# Patient Record
Sex: MALE | Race: WHITE | NOT HISPANIC OR LATINO
[De-identification: names, ages, dates, MRNs, and addresses within clinical notes are randomized per-mention and may not be internally consistent; named-entity substitution may affect disease eponyms.]

---

## 2020-08-04 ENCOUNTER — LABORATORY RESULT (OUTPATIENT)
Age: 85
End: 2020-08-04

## 2020-08-04 ENCOUNTER — APPOINTMENT (OUTPATIENT)
Dept: HEMATOLOGY ONCOLOGY | Facility: CLINIC | Age: 85
End: 2020-08-04
Payer: MEDICARE

## 2020-08-04 VITALS
BODY MASS INDEX: 26.36 KG/M2 | HEART RATE: 71 BPM | WEIGHT: 180 LBS | RESPIRATION RATE: 16 BRPM | TEMPERATURE: 96.6 F | HEIGHT: 69.29 IN | DIASTOLIC BLOOD PRESSURE: 62 MMHG | SYSTOLIC BLOOD PRESSURE: 122 MMHG

## 2020-08-04 DIAGNOSIS — Z78.9 OTHER SPECIFIED HEALTH STATUS: ICD-10-CM

## 2020-08-04 DIAGNOSIS — Z83.3 FAMILY HISTORY OF DIABETES MELLITUS: ICD-10-CM

## 2020-08-04 DIAGNOSIS — Z86.39 PERSONAL HISTORY OF OTHER ENDOCRINE, NUTRITIONAL AND METABOLIC DISEASE: ICD-10-CM

## 2020-08-04 PROCEDURE — 99205 OFFICE O/P NEW HI 60 MIN: CPT

## 2020-08-04 RX ORDER — ACETAMINOPHEN/DIPHENHYDRAMINE 500MG-25MG
1000 TABLET ORAL
Qty: 30 | Refills: 5 | Status: ACTIVE | COMMUNITY
Start: 2020-08-04 | End: 1900-01-01

## 2020-08-04 RX ORDER — FOLIC ACID 1 MG/1
1 TABLET ORAL
Qty: 30 | Refills: 5 | Status: ACTIVE | COMMUNITY
Start: 2020-08-04 | End: 1900-01-01

## 2020-08-04 NOTE — ASSESSMENT
[FreeTextEntry1] : 83 yo M with PMHx of IDDM presents here today for establishment of care. He was referred here for Anemia. Based on the previous blood work, patient has worsening Iron Deficiency Anemia and suspect that this is secondary likely to GI Bleed; noted that in 2019, patient's colonoscopy was "poorly prepped."  patient noted to have low Vitamin B12 on previous blood work. \par \par Recommend GI eval asap\par IRON replacement\par PRBC transfusion as he is symptomatic from anemia (fatigue)\par  CT chest abdomen and pelvis with IV contrast ordered as well as previous CT scan of chest (only with oral contrast) showed some mediastinal lymphadenopathy. \par \par  will check CBC, CMP, Iron studies, MMA and Homocysteine (given his low B12 level), Retic Count, LDH. Patient and daughter instructed to follow up with GI (referral given) as patient will need a repeat colonoscopy. Given his reported severe fatigue, he will receive 2 units of PRBC and will start on Venofer 200mg x 5 doses. \par \par

## 2020-08-04 NOTE — REVIEW OF SYSTEMS
[Incontinence] : incontinence [Negative] : Gastrointestinal [Fever] : no fever [FreeTextEntry8] : Pt has known enlarged prostate  [Chills] : no chills [Night Sweats] : no night sweats

## 2020-08-04 NOTE — HISTORY OF PRESENT ILLNESS
[de-identified] : Patient is a 85 yo M with PMHx of DM presents here today for establishment of care. He was referred here for Anemia. Blood work reviewed and shows that patient had a H/H of 13/41.3 in September 2019. He had a repeat in November 2019 which was 9.2/28 with MCV 84.2. Iron studies showed Iron <10, Ferritin 10.3 with 2% saturation. Patient had a colonoscopy and EGD done in November of 2019. Patient was poorly prepped for Colonoscopy and had one polyp removed. EGD was unremarkable. Since then, patient has had persistently decreasing hemoglobin levels with last hemoglobin level of 7.2 with MCV 64 in July 2020. He reports extreme fatigue but otherwise denies any shortness of breath, chest pain, palpitations, melena, or hematochezia. \par \par Other blood work revealed NL to elevated Folic Acid, Vitamin B12 of 152, Homocysteine 14

## 2020-08-05 LAB
ABO + RH PNL BLD: NORMAL
ALBUMIN SERPL ELPH-MCNC: 4.5 G/DL
ALP BLD-CCNC: 52 U/L
ALT SERPL-CCNC: 10 U/L
ANION GAP SERPL CALC-SCNC: 15 MMOL/L
AST SERPL-CCNC: 14 U/L
BILIRUB DIRECT SERPL-MCNC: <0.2 MG/DL
BILIRUB INDIRECT SERPL-MCNC: >0.2 MG/DL
BILIRUB SERPL-MCNC: 0.4 MG/DL
BLD GP AB SCN SERPL QL: NORMAL
BUN SERPL-MCNC: 40 MG/DL
CALCIUM SERPL-MCNC: 9.2 MG/DL
CHLORIDE SERPL-SCNC: 98 MMOL/L
CO2 SERPL-SCNC: 26 MMOL/L
CREAT SERPL-MCNC: 1.3 MG/DL
FERRITIN SERPL-MCNC: 10 NG/ML
FOLATE SERPL-MCNC: 14.3 NG/ML
GLUCOSE SERPL-MCNC: 185 MG/DL
HCT VFR BLD CALC: 29.9 %
HCYS SERPL-MCNC: 20 UMOL/L
HGB BLD-MCNC: 8.1 G/DL
IRON SATN MFR SERPL: 28 %
IRON SERPL-MCNC: 95 UG/DL
LDH SERPL-CCNC: 155 U/L
MAGNESIUM SERPL-MCNC: 2.6 MG/DL
MCHC RBC-ENTMCNC: 18.1 PG
MCHC RBC-ENTMCNC: 27.1 G/DL
MCV RBC AUTO: 66.9 FL
PLATELET # BLD AUTO: 245 K/UL
PMV BLD: 10 FL
POTASSIUM SERPL-SCNC: 4.9 MMOL/L
PROT SERPL-MCNC: 6.8 G/DL
RBC # BLD: 4.47 M/UL
RBC # FLD: 22.6 %
RETICS # AUTO: 1.7 %
RETICS AGGREG/RBC NFR: 74.2 K/UL
SODIUM SERPL-SCNC: 139 MMOL/L
TIBC SERPL-MCNC: 336 UG/DL
UIBC SERPL-MCNC: 241 UG/DL
URATE SERPL-MCNC: 7.5 MG/DL
VIT B12 SERPL-MCNC: 409 PG/ML
WBC # FLD AUTO: 7.12 K/UL

## 2020-08-07 LAB — METHYLMALONATE SERPL-SCNC: 133 NMOL/L

## 2020-08-12 ENCOUNTER — OUTPATIENT (OUTPATIENT)
Dept: OUTPATIENT SERVICES | Facility: HOSPITAL | Age: 85
LOS: 1 days | Discharge: HOME | End: 2020-08-12

## 2020-08-12 ENCOUNTER — OUTPATIENT (OUTPATIENT)
Dept: OUTPATIENT SERVICES | Facility: HOSPITAL | Age: 85
LOS: 1 days | Discharge: HOME | End: 2020-08-12
Payer: MEDICARE

## 2020-08-12 ENCOUNTER — LABORATORY RESULT (OUTPATIENT)
Age: 85
End: 2020-08-12

## 2020-08-12 ENCOUNTER — RESULT REVIEW (OUTPATIENT)
Age: 85
End: 2020-08-12

## 2020-08-12 DIAGNOSIS — D50.9 IRON DEFICIENCY ANEMIA, UNSPECIFIED: ICD-10-CM

## 2020-08-12 DIAGNOSIS — Z11.59 ENCOUNTER FOR SCREENING FOR OTHER VIRAL DISEASES: ICD-10-CM

## 2020-08-12 PROCEDURE — 71260 CT THORAX DX C+: CPT | Mod: 26

## 2020-08-12 PROCEDURE — 74177 CT ABD & PELVIS W/CONTRAST: CPT | Mod: 26

## 2020-08-14 ENCOUNTER — APPOINTMENT (OUTPATIENT)
Dept: INFUSION THERAPY | Facility: CLINIC | Age: 85
End: 2020-08-14

## 2020-08-14 DIAGNOSIS — Z02.9 ENCOUNTER FOR ADMINISTRATIVE EXAMINATIONS, UNSPECIFIED: ICD-10-CM

## 2020-08-14 DIAGNOSIS — Z11.59 ENCOUNTER FOR SCREENING FOR OTHER VIRAL DISEASES: ICD-10-CM

## 2020-08-14 RX ORDER — IRON SUCROSE 20 MG/ML
200 INJECTION, SOLUTION INTRAVENOUS ONCE
Refills: 0 | Status: COMPLETED | OUTPATIENT
Start: 2020-08-14 | End: 2020-08-14

## 2020-08-14 RX ADMIN — IRON SUCROSE 520 MILLIGRAM(S): 20 INJECTION, SOLUTION INTRAVENOUS at 10:12

## 2020-08-14 RX ADMIN — IRON SUCROSE 200 MILLIGRAM(S): 20 INJECTION, SOLUTION INTRAVENOUS at 10:42

## 2020-08-17 ENCOUNTER — APPOINTMENT (OUTPATIENT)
Dept: INFUSION THERAPY | Facility: CLINIC | Age: 85
End: 2020-08-17

## 2020-08-17 RX ORDER — IRON SUCROSE 20 MG/ML
200 INJECTION, SOLUTION INTRAVENOUS ONCE
Refills: 0 | Status: COMPLETED | OUTPATIENT
Start: 2020-08-17 | End: 2020-08-17

## 2020-08-17 RX ADMIN — IRON SUCROSE 220 MILLIGRAM(S): 20 INJECTION, SOLUTION INTRAVENOUS at 10:58

## 2020-08-17 RX ADMIN — IRON SUCROSE 200 MILLIGRAM(S): 20 INJECTION, SOLUTION INTRAVENOUS at 11:30

## 2020-08-21 ENCOUNTER — APPOINTMENT (OUTPATIENT)
Dept: INFUSION THERAPY | Facility: CLINIC | Age: 85
End: 2020-08-21

## 2020-08-21 RX ORDER — IRON SUCROSE 20 MG/ML
200 INJECTION, SOLUTION INTRAVENOUS ONCE
Refills: 0 | Status: COMPLETED | OUTPATIENT
Start: 2020-08-21 | End: 2020-08-21

## 2020-08-21 RX ADMIN — IRON SUCROSE 200 MILLIGRAM(S): 20 INJECTION, SOLUTION INTRAVENOUS at 11:06

## 2020-08-21 RX ADMIN — IRON SUCROSE 220 MILLIGRAM(S): 20 INJECTION, SOLUTION INTRAVENOUS at 10:36

## 2020-08-24 ENCOUNTER — APPOINTMENT (OUTPATIENT)
Dept: INFUSION THERAPY | Facility: CLINIC | Age: 85
End: 2020-08-24

## 2020-08-24 RX ORDER — IRON SUCROSE 20 MG/ML
200 INJECTION, SOLUTION INTRAVENOUS ONCE
Refills: 0 | Status: COMPLETED | OUTPATIENT
Start: 2020-08-24 | End: 2020-08-24

## 2020-08-24 RX ADMIN — IRON SUCROSE 200 MILLIGRAM(S): 20 INJECTION, SOLUTION INTRAVENOUS at 11:50

## 2020-08-24 RX ADMIN — IRON SUCROSE 220 MILLIGRAM(S): 20 INJECTION, SOLUTION INTRAVENOUS at 11:20

## 2020-08-25 ENCOUNTER — APPOINTMENT (OUTPATIENT)
Dept: HEMATOLOGY ONCOLOGY | Facility: CLINIC | Age: 85
End: 2020-08-25

## 2020-08-28 ENCOUNTER — LABORATORY RESULT (OUTPATIENT)
Age: 85
End: 2020-08-28

## 2020-08-28 ENCOUNTER — APPOINTMENT (OUTPATIENT)
Dept: HEMATOLOGY ONCOLOGY | Facility: CLINIC | Age: 85
End: 2020-08-28
Payer: MEDICARE

## 2020-08-28 ENCOUNTER — APPOINTMENT (OUTPATIENT)
Dept: INFUSION THERAPY | Facility: CLINIC | Age: 85
End: 2020-08-28
Payer: MEDICARE

## 2020-08-28 VITALS
DIASTOLIC BLOOD PRESSURE: 56 MMHG | TEMPERATURE: 97.2 F | HEIGHT: 69 IN | BODY MASS INDEX: 27.25 KG/M2 | SYSTOLIC BLOOD PRESSURE: 122 MMHG | RESPIRATION RATE: 16 BRPM | HEART RATE: 84 BPM | WEIGHT: 184 LBS

## 2020-08-28 PROCEDURE — 99215 OFFICE O/P EST HI 40 MIN: CPT

## 2020-08-28 RX ORDER — IRON SUCROSE 20 MG/ML
200 INJECTION, SOLUTION INTRAVENOUS ONCE
Refills: 0 | Status: COMPLETED | OUTPATIENT
Start: 2020-08-28 | End: 2020-08-28

## 2020-08-28 RX ADMIN — IRON SUCROSE 200 MILLIGRAM(S): 20 INJECTION, SOLUTION INTRAVENOUS at 11:33

## 2020-08-28 RX ADMIN — IRON SUCROSE 220 MILLIGRAM(S): 20 INJECTION, SOLUTION INTRAVENOUS at 11:13

## 2020-08-28 NOTE — REVIEW OF SYSTEMS
[Incontinence] : incontinence [Negative] : Musculoskeletal [Fever] : no fever [Night Sweats] : no night sweats [Chills] : no chills [FreeTextEntry8] : Pt has known enlarged prostate

## 2020-08-28 NOTE — CONSULT LETTER
[Dear  ___] : Dear  [unfilled], [Please see my note below.] : Please see my note below. [Consult Closing:] : Thank you very much for allowing me to participate in the care of this patient.  If you have any questions, please do not hesitate to contact me. [Consult Letter:] : I had the pleasure of evaluating your patient, [unfilled]. [Sincerely,] : Sincerely, [DrStella  ___] : Dr. BONILLA [FreeTextEntry3] : Dr. Botello

## 2020-08-28 NOTE — ASSESSMENT
[FreeTextEntry1] : 85 yo M with PMHx of IDDM presents here today for establishment of care. He was referred here for Anemia. Based on the previous blood work, patient has worsening Iron Deficiency Anemia and suspect that this is secondary likely to GI Bleed; noted that in 2019, patient's colonoscopy was "poorly prepped."  patient noted to have low Vitamin B12 on previous blood work. \par - s/p capsule endoscopy as per pt\par - followup with GI, Dr. Tamez, as indicated; needs a repeat colonoscopy. \par - to complete Venofer 200mg x 5 doses. \par \par #Prostatomegaly with thick-walled urinary bladder noted on CT from 08/2020\par - Urologic follow-up recommended, Dr. Margarito Pemberton to r/o bladder neoplasm, and correlation with direct visualization as warranted\par \par # 3.2 x 3.2 cm hypoattenuating pancreatic tail lesion; cystic lesions in the pancreatic head on CT from 08/2020\par - Recommend evaluation with pancreatic protocol MRI, ordered\par \par # RUL spiculated nodule, suspicious.  Noted on 08/2020\par - Short-term follow-up in 3 months, PET/CT or tissue sampling is recommended (~11/2020): will defer now until he resolves pancreas issue and HOMERO issue and bladder issue\par \par RTC in 3 weeks, post MRI abdomen (to r/o pancreas neoplasm

## 2020-08-28 NOTE — HISTORY OF PRESENT ILLNESS
[de-identified] : Patient is a 83 yo M with PMHx of DM presents here today for establishment of care. He was referred here for Anemia. Blood work reviewed and shows that patient had a H/H of 13/41.3 in September 2019. He had a repeat in November 2019 which was 9.2/28 with MCV 84.2. Iron studies showed Iron <10, Ferritin 10.3 with 2% saturation. Patient had a colonoscopy and EGD done in November of 2019. Patient was poorly prepped for Colonoscopy and had one polyp removed. EGD was unremarkable. Since then, patient has had persistently decreasing hemoglobin levels with last hemoglobin level of 7.2 with MCV 64 in July 2020. He reports extreme fatigue but otherwise denies any shortness of breath, chest pain, palpitations, melena, or hematochezia. \par \par Other blood work revealed NL to elevated Folic Acid, Vitamin B12 of 152, Homocysteine 14   [de-identified] : 8/28/2020\par Patient is here for a follow-up visit for HOMERO, accompanied by family member.  Most recent CBC shows slight improvement in microcytic anemia, hgb now at 9.2g/dL.  Patient denies fever, chills, nausea, vomiting, pica or bleeding.  They report his appetite has improved slightly.  Spoke with patient at length regarding recent CT imaging which shows suspicious 8mm RUL nodule, pancreatic lesions and urinary bladder thickening.  However, the patient's family member states he had UroLift procedure on 8/6, just prior to recent imaging so this may be related.  Since last visit, they have also been seen by GI, Dr. Tamez, for capsule endoscopy.  \par CT C/A/P (8.28.2020) IMPRESSION:No thoracic lymphadenopathy.  8 mm speculated solid nodule in the apical right upper lobe. Short-term follow-up in 3 months, PET/CT or tissue sampling is recommended. Main pulmonary artery is borderline enlarged measuring approximately 3.2 cm which can be seen with pulmonary hypertension.  3.2 x 3.2 cm hypoattenuating pancreatic tail lesion may represent a multicystic septated lesion.  Additional hypoattenuating/cystic lesions in the pancreatic head. Recommend evaluation with pancreatic protocol MRI. Diffusely markedly trabeculated thick-walled urinary bladder. Urologic follow-up recommended and correlation with direct visualization as warranted. Prostatomegaly.  No lymphadenopathy identified in the abdomen or pelvis.

## 2020-09-01 LAB
HCT VFR BLD CALC: 31.2 %
HGB BLD-MCNC: 9.2 G/DL
MCHC RBC-ENTMCNC: 20.9 PG
MCHC RBC-ENTMCNC: 29.5 G/DL
MCV RBC AUTO: 70.7 FL
PLATELET # BLD AUTO: 129 K/UL
PMV BLD: NORMAL
RBC # BLD: 4.41 M/UL
RBC # FLD: 27.4 %
WBC # FLD AUTO: 7.92 K/UL

## 2020-09-10 ENCOUNTER — OUTPATIENT (OUTPATIENT)
Dept: OUTPATIENT SERVICES | Facility: HOSPITAL | Age: 85
LOS: 1 days | Discharge: HOME | End: 2020-09-10
Payer: MEDICARE

## 2020-09-10 ENCOUNTER — RESULT REVIEW (OUTPATIENT)
Age: 85
End: 2020-09-10

## 2020-09-10 DIAGNOSIS — D50.9 IRON DEFICIENCY ANEMIA, UNSPECIFIED: ICD-10-CM

## 2020-09-10 PROCEDURE — 74183 MRI ABD W/O CNTR FLWD CNTR: CPT | Mod: 26

## 2020-09-18 ENCOUNTER — LABORATORY RESULT (OUTPATIENT)
Age: 85
End: 2020-09-18

## 2020-09-18 ENCOUNTER — APPOINTMENT (OUTPATIENT)
Dept: HEMATOLOGY ONCOLOGY | Facility: CLINIC | Age: 85
End: 2020-09-18
Payer: MEDICARE

## 2020-09-18 VITALS
DIASTOLIC BLOOD PRESSURE: 55 MMHG | TEMPERATURE: 97.1 F | SYSTOLIC BLOOD PRESSURE: 121 MMHG | RESPIRATION RATE: 16 BRPM | HEIGHT: 69 IN | WEIGHT: 188 LBS | BODY MASS INDEX: 27.85 KG/M2 | HEART RATE: 74 BPM

## 2020-09-18 DIAGNOSIS — Z00.00 ENCOUNTER FOR GENERAL ADULT MEDICAL EXAMINATION W/OUT ABNORMAL FINDINGS: ICD-10-CM

## 2020-09-18 PROCEDURE — 99214 OFFICE O/P EST MOD 30 MIN: CPT

## 2020-09-21 LAB
HCT VFR BLD CALC: 29.8 %
HGB BLD-MCNC: 8.8 G/DL
MCHC RBC-ENTMCNC: 21.2 PG
MCHC RBC-ENTMCNC: 29.5 G/DL
MCV RBC AUTO: 71.6 FL
PLATELET # BLD AUTO: 191 K/UL
PMV BLD: 9.8 FL
RBC # BLD: 4.16 M/UL
RBC # FLD: 22.7 %
WBC # FLD AUTO: 7.81 K/UL

## 2020-09-29 NOTE — ASSESSMENT
[FreeTextEntry1] : 84 yo M with PMHx of IDDM presents here today for establishment of care. He was referred here for Anemia. Based on the previous blood work, patient has worsening Iron Deficiency Anemia and suspect that this is secondary likely to GI Bleed; noted that in 2019, patient's colonoscopy was "poorly prepped."  patient noted to have low Vitamin B12 on previous blood work. \par - s/p capsule endoscopy as per pt; requested results \par - followup with GI, Dr. Tamez, as indicated; needs a repeat colonoscopy. \par - s/p Venofer 200mg x 5 doses as of 8/28/2020 : f/u in end of October 2020\par \par #Prostatomegaly with thick-walled urinary bladder noted on CT from 08/2020\par - Urologic follow-up recommended, Dr. 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  Noted on 08/2020\par - Short-term follow-up in 3 months, PET/CT or tissue sampling is recommended (~11/2020): will defer now until he resolves pancreas issue and HOMERO issue and bladder issue: PET/CT Nov 2020 to further evaluate lung mass (and pancreas mass)\par \par

## 2020-09-29 NOTE — REVIEW OF SYSTEMS
[Incontinence] : incontinence [Negative] : Musculoskeletal [Fever] : no fever [Chills] : no chills [Night Sweats] : no night sweats [FreeTextEntry8] : Pt has known enlarged prostate

## 2020-09-29 NOTE — CONSULT LETTER
[Dear  ___] : Dear  [unfilled], [Consult Letter:] : I had the pleasure of evaluating your patient, [unfilled]. [Please see my note below.] : Please see my note below. [Consult Closing:] : Thank you very much for allowing me to participate in the care of this patient.  If you have any questions, please do not hesitate to contact me. [Sincerely,] : Sincerely, [DrStella  ___] : Dr. BONILLA [FreeTextEntry3] : Dr. Botello

## 2020-09-29 NOTE — HISTORY OF PRESENT ILLNESS
[de-identified] : Patient is a 83 yo M with PMHx of DM presents here today for establishment of care. He was referred here for Anemia. Blood work reviewed and shows that patient had a H/H of 13/41.3 in September 2019. He had a repeat in November 2019 which was 9.2/28 with MCV 84.2. Iron studies showed Iron <10, Ferritin 10.3 with 2% saturation. Patient had a colonoscopy and EGD done in November of 2019. Patient was poorly prepped for Colonoscopy and had one polyp removed. EGD was unremarkable. Since then, patient has had persistently decreasing hemoglobin levels with last hemoglobin level of 7.2 with MCV 64 in July 2020. He reports extreme fatigue but otherwise denies any shortness of breath, chest pain, palpitations, melena, or hematochezia. \par \par Other blood work revealed NL to elevated Folic Acid, Vitamin B12 of 152, Homocysteine 14   [de-identified] : 8/28/2020\par Patient is here for a follow-up visit for HOMERO, accompanied by family member.  Most recent CBC shows slight improvement in microcytic anemia, hgb now at 9.2g/dL.  Patient denies fever, chills, nausea, vomiting, pica or bleeding.  They report his appetite has improved slightly.  Spoke with patient at length regarding recent CT imaging which shows suspicious 8mm RUL nodule, pancreatic lesions and urinary bladder thickening.  However, the patient's family member states he had UroLift procedure on 8/6, just prior to recent imaging so this may be related.  Since last visit, they have also been seen by GI, Dr. Tamez, for capsule endoscopy.  \par CT C/A/P (8.28.2020) IMPRESSION:No thoracic lymphadenopathy.  8 mm speculated solid nodule in the apical right upper lobe. Short-term follow-up in 3 months, PET/CT or tissue sampling is recommended. Main pulmonary artery is borderline enlarged measuring approximately 3.2 cm which can be seen with pulmonary hypertension.  3.2 x 3.2 cm hypoattenuating pancreatic tail lesion may represent a multicystic septated lesion.  Additional hypoattenuating/cystic lesions in the pancreatic head. Recommend evaluation with pancreatic protocol MRI. Diffusely markedly trabeculated thick-walled urinary bladder. Urologic follow-up recommended and correlation with direct visualization as warranted. Prostatomegaly.  No lymphadenopathy identified in the abdomen or pelvis.\par \par 9/18/2020\par Patient is here for a follow-up visit for HOMERO, accompanied by family member.  He is s/p Venofer 200mg x 5 doses as of 8/28/2020.  Patient denies fever, chills, nausea, vomiting, pica or bleeding.  Spoke with patient regarding recent MR Abd which shows multicystic lesions in pancreatic tail and recommends biopsy.  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 No mural nodularity or solid components identified. EUS/FNA or imaging follow-up may be considered as clinically warranted. Additional scattered cystic lesions in the pancreas.

## 2020-09-30 ENCOUNTER — APPOINTMENT (OUTPATIENT)
Dept: HEMATOLOGY ONCOLOGY | Facility: CLINIC | Age: 85
End: 2020-09-30

## 2020-10-02 ENCOUNTER — APPOINTMENT (OUTPATIENT)
Dept: HEMATOLOGY ONCOLOGY | Facility: CLINIC | Age: 85
End: 2020-10-02

## 2020-10-28 ENCOUNTER — APPOINTMENT (OUTPATIENT)
Dept: HEMATOLOGY ONCOLOGY | Facility: CLINIC | Age: 85
End: 2020-10-28

## 2020-10-28 ENCOUNTER — LABORATORY RESULT (OUTPATIENT)
Age: 85
End: 2020-10-28

## 2020-10-28 LAB
ALBUMIN SERPL ELPH-MCNC: 3.9 G/DL
ALP BLD-CCNC: 44 U/L
ALT SERPL-CCNC: 9 U/L
ANION GAP SERPL CALC-SCNC: 13 MMOL/L
AST SERPL-CCNC: 11 U/L
BILIRUB DIRECT SERPL-MCNC: <0.2 MG/DL
BILIRUB INDIRECT SERPL-MCNC: NORMAL MG/DL
BILIRUB SERPL-MCNC: <0.2 MG/DL
BUN SERPL-MCNC: 23 MG/DL
CALCIUM SERPL-MCNC: 8.9 MG/DL
CHLORIDE SERPL-SCNC: 104 MMOL/L
CO2 SERPL-SCNC: 25 MMOL/L
CREAT SERPL-MCNC: 1.1 MG/DL
GLUCOSE SERPL-MCNC: 64 MG/DL
HCT VFR BLD CALC: 25.7 %
HGB BLD-MCNC: 7.4 G/DL
IRON SATN MFR SERPL: 5 %
IRON SERPL-MCNC: 17 UG/DL
MCHC RBC-ENTMCNC: 19.4 PG
MCHC RBC-ENTMCNC: 28.8 G/DL
MCV RBC AUTO: 67.3 FL
PLATELET # BLD AUTO: 225 K/UL
PMV BLD: 9.2 FL
POTASSIUM SERPL-SCNC: 4.3 MMOL/L
PROT SERPL-MCNC: 6.3 G/DL
RBC # BLD: 3.82 M/UL
RBC # FLD: 19.8 %
SODIUM SERPL-SCNC: 142 MMOL/L
TIBC SERPL-MCNC: 311 UG/DL
UIBC SERPL-MCNC: 294 UG/DL
WBC # FLD AUTO: 7.56 K/UL

## 2020-10-29 LAB
CANCER AG125 SERPL-ACNC: 85 U/ML
FERRITIN SERPL-MCNC: 6 NG/ML

## 2020-10-30 ENCOUNTER — OUTPATIENT (OUTPATIENT)
Dept: OUTPATIENT SERVICES | Facility: HOSPITAL | Age: 85
LOS: 1 days | Discharge: HOME | End: 2020-10-30

## 2020-10-30 ENCOUNTER — LABORATORY RESULT (OUTPATIENT)
Age: 85
End: 2020-10-30

## 2020-10-30 ENCOUNTER — APPOINTMENT (OUTPATIENT)
Dept: HEMATOLOGY ONCOLOGY | Facility: CLINIC | Age: 85
End: 2020-10-30
Payer: MEDICARE

## 2020-10-30 VITALS
RESPIRATION RATE: 16 BRPM | HEART RATE: 76 BPM | BODY MASS INDEX: 28.88 KG/M2 | WEIGHT: 195 LBS | SYSTOLIC BLOOD PRESSURE: 116 MMHG | DIASTOLIC BLOOD PRESSURE: 49 MMHG | TEMPERATURE: 96.7 F | HEIGHT: 69 IN

## 2020-10-30 DIAGNOSIS — Z83.3 FAMILY HISTORY OF DIABETES MELLITUS: ICD-10-CM

## 2020-10-30 DIAGNOSIS — D50.9 IRON DEFICIENCY ANEMIA, UNSPECIFIED: ICD-10-CM

## 2020-10-30 DIAGNOSIS — Z78.9 OTHER SPECIFIED HEALTH STATUS: ICD-10-CM

## 2020-10-30 DIAGNOSIS — Z11.59 ENCOUNTER FOR SCREENING FOR OTHER VIRAL DISEASES: ICD-10-CM

## 2020-10-30 DIAGNOSIS — Z86.39 PERSONAL HISTORY OF OTHER ENDOCRINE, NUTRITIONAL AND METABOLIC DISEASE: ICD-10-CM

## 2020-10-30 LAB
HCT VFR BLD CALC: 25.1 %
HGB BLD-MCNC: 7.3 G/DL
MCHC RBC-ENTMCNC: 19 PG
MCHC RBC-ENTMCNC: 29.1 G/DL
MCV RBC AUTO: 65.4 FL
PLATELET # BLD AUTO: 208 K/UL
PMV BLD: 10.1 FL
RBC # BLD: 3.84 M/UL
RBC # FLD: 19.7 %
WBC # FLD AUTO: 9.11 K/UL

## 2020-10-30 PROCEDURE — 99214 OFFICE O/P EST MOD 30 MIN: CPT

## 2020-10-30 NOTE — ASSESSMENT
[FreeTextEntry1] : 86 yo M with PMHx of IDDM presents here today for establishment of care. He was referred here for Anemia. Based on the previous blood work, patient has worsening Iron Deficiency Anemia and suspect that this is secondary likely to GI Bleed; noted that in 2019, patient's colonoscopy was "poorly prepped."  patient noted to have low Vitamin B12 on previous blood work. \par - s/p capsule endoscopy as per pt; requested results \par - followup with GI, Dr. Tamez, as indicated; needs a repeat colonoscopy. \par - s/p Venofer 200mg x 5 doses as of 8/28/2020\par \par #Prostatomegaly with thick-walled urinary bladder noted on CT from 08/2020\par - Urologic follow-up recommended, Dr. 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  Noted on 08/2020\par - Short-term follow-up in 3 months, PET/CT or tissue sampling is recommended (~11/2020): will defer now until he resolves pancreas issue and HOMERO issue and bladder issue: PET/CT Nov 2020 to further evaluate lung mass (and pancreas mass)\par \par severe anemia, iron deficiency:\par GI f/u\par iron infusion\par t/c and prbc 1 unit\par rtc after pet scan\par R/O DVT: doppler

## 2020-10-30 NOTE — CONSULT LETTER
[Dear  ___] : Dear  [unfilled], [Consult Letter:] : I had the pleasure of evaluating your patient, [unfilled]. [Please see my note below.] : Please see my note below. [Consult Closing:] : Thank you very much for allowing me to participate in the care of this patient.  If you have any questions, please do not hesitate to contact me. [Sincerely,] : Sincerely, [DrStlela  ___] : Dr. BONILLA [FreeTextEntry3] : Dr. Botello

## 2020-10-30 NOTE — HISTORY OF PRESENT ILLNESS
[de-identified] : Patient is a 83 yo M with PMHx of DM presents here today for establishment of care. He was referred here for Anemia. Blood work reviewed and shows that patient had a H/H of 13/41.3 in September 2019. He had a repeat in November 2019 which was 9.2/28 with MCV 84.2. Iron studies showed Iron <10, Ferritin 10.3 with 2% saturation. Patient had a colonoscopy and EGD done in November of 2019. Patient was poorly prepped for Colonoscopy and had one polyp removed. EGD was unremarkable. Since then, patient has had persistently decreasing hemoglobin levels with last hemoglobin level of 7.2 with MCV 64 in July 2020. He reports extreme fatigue but otherwise denies any shortness of breath, chest pain, palpitations, melena, or hematochezia. \par \par Other blood work revealed NL to elevated Folic Acid, Vitamin B12 of 152, Homocysteine 14   [de-identified] : 8/28/2020\par Patient is here for a follow-up visit for HOMERO, accompanied by family member.  Most recent CBC shows slight improvement in microcytic anemia, hgb now at 9.2g/dL.  Patient denies fever, chills, nausea, vomiting, pica or bleeding.  They report his appetite has improved slightly.  Spoke with patient at length regarding recent CT imaging which shows suspicious 8mm RUL nodule, pancreatic lesions and urinary bladder thickening.  However, the patient's family member states he had UroLift procedure on 8/6, just prior to recent imaging so this may be related.  Since last visit, they have also been seen by GI, Dr. Tamez, for capsule endoscopy.  \par CT C/A/P (8.28.2020) IMPRESSION:No thoracic lymphadenopathy.  8 mm speculated solid nodule in the apical right upper lobe. Short-term follow-up in 3 months, PET/CT or tissue sampling is recommended. Main pulmonary artery is borderline enlarged measuring approximately 3.2 cm which can be seen with pulmonary hypertension.  3.2 x 3.2 cm hypoattenuating pancreatic tail lesion may represent a multicystic septated lesion.  Additional hypoattenuating/cystic lesions in the pancreatic head. Recommend evaluation with pancreatic protocol MRI. Diffusely markedly trabeculated thick-walled urinary bladder. Urologic follow-up recommended and correlation with direct visualization as warranted. Prostatomegaly.  No lymphadenopathy identified in the abdomen or pelvis.\par \par 9/18/2020\par Patient is here for a follow-up visit for HOMERO, accompanied by family member.  He is s/p Venofer 200mg x 5 doses as of 8/28/2020.  Patient denies fever, chills, nausea, vomiting, pica or bleeding.  Spoke with patient regarding recent MR Abd which shows multicystic lesions in pancreatic tail and recommends biopsy.  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 No mural nodularity or solid components identified. EUS/FNA or imaging follow-up may be considered as clinically warranted. Additional scattered cystic lesions in the pancreas.\par \par 10/30/2020\par Patient is here for a follow-up visit for HOMERO, accompanied by family member.  Last Venofer x 5 doses completed in 8/2020.  Patient denies fever, chills, nausea, vomiting, pica or bleeding.  Reviewed most recent labwork which shows microcytic anemia with evidence of iron deficiency.  Spoke with patient regarding recent MR Abd which shows multicystic lesions in pancreatic tail and recommends biopsy.

## 2020-11-02 ENCOUNTER — APPOINTMENT (OUTPATIENT)
Dept: HEMATOLOGY ONCOLOGY | Facility: CLINIC | Age: 85
End: 2020-11-02

## 2020-11-02 DIAGNOSIS — Z02.9 ENCOUNTER FOR ADMINISTRATIVE EXAMINATIONS, UNSPECIFIED: ICD-10-CM

## 2020-11-02 DIAGNOSIS — Z11.59 ENCOUNTER FOR SCREENING FOR OTHER VIRAL DISEASES: ICD-10-CM

## 2020-11-03 ENCOUNTER — APPOINTMENT (OUTPATIENT)
Dept: INFUSION THERAPY | Facility: CLINIC | Age: 85
End: 2020-11-03

## 2020-11-03 LAB
ABO + RH PNL BLD: NORMAL
BLD GP AB SCN SERPL QL: NORMAL

## 2020-11-03 RX ORDER — IRON SUCROSE 20 MG/ML
200 INJECTION, SOLUTION INTRAVENOUS ONCE
Refills: 0 | Status: COMPLETED | OUTPATIENT
Start: 2020-11-03 | End: 2020-11-03

## 2020-11-03 RX ADMIN — IRON SUCROSE 220 MILLIGRAM(S): 20 INJECTION, SOLUTION INTRAVENOUS at 13:31

## 2020-11-05 ENCOUNTER — RESULT REVIEW (OUTPATIENT)
Age: 85
End: 2020-11-05

## 2020-11-05 ENCOUNTER — OUTPATIENT (OUTPATIENT)
Dept: OUTPATIENT SERVICES | Facility: HOSPITAL | Age: 85
LOS: 1 days | Discharge: HOME | End: 2020-11-05
Payer: MEDICARE

## 2020-11-05 DIAGNOSIS — M79.604 PAIN IN RIGHT LEG: ICD-10-CM

## 2020-11-05 DIAGNOSIS — D50.9 IRON DEFICIENCY ANEMIA, UNSPECIFIED: ICD-10-CM

## 2020-11-05 DIAGNOSIS — M79.605 PAIN IN LEFT LEG: ICD-10-CM

## 2020-11-05 PROCEDURE — 93970 EXTREMITY STUDY: CPT | Mod: 26

## 2020-11-10 ENCOUNTER — APPOINTMENT (OUTPATIENT)
Dept: INFUSION THERAPY | Facility: CLINIC | Age: 85
End: 2020-11-10

## 2020-11-10 RX ORDER — IRON SUCROSE 20 MG/ML
200 INJECTION, SOLUTION INTRAVENOUS ONCE
Refills: 0 | Status: COMPLETED | OUTPATIENT
Start: 2020-11-10 | End: 2020-11-10

## 2020-11-10 RX ADMIN — IRON SUCROSE 200 MILLIGRAM(S): 20 INJECTION, SOLUTION INTRAVENOUS at 14:20

## 2020-11-10 RX ADMIN — IRON SUCROSE 110 MILLIGRAM(S): 20 INJECTION, SOLUTION INTRAVENOUS at 13:50

## 2020-11-17 ENCOUNTER — APPOINTMENT (OUTPATIENT)
Dept: INFUSION THERAPY | Facility: CLINIC | Age: 85
End: 2020-11-17

## 2020-11-17 RX ORDER — IRON SUCROSE 20 MG/ML
200 INJECTION, SOLUTION INTRAVENOUS ONCE
Refills: 0 | Status: COMPLETED | OUTPATIENT
Start: 2020-11-17 | End: 2020-11-17

## 2020-11-17 RX ADMIN — IRON SUCROSE 220 MILLIGRAM(S): 20 INJECTION, SOLUTION INTRAVENOUS at 13:34

## 2020-11-17 RX ADMIN — IRON SUCROSE 200 MILLIGRAM(S): 20 INJECTION, SOLUTION INTRAVENOUS at 14:04

## 2020-11-23 ENCOUNTER — OUTPATIENT (OUTPATIENT)
Dept: OUTPATIENT SERVICES | Facility: HOSPITAL | Age: 85
LOS: 1 days | Discharge: HOME | End: 2020-11-23
Payer: MEDICARE

## 2020-11-23 ENCOUNTER — RESULT REVIEW (OUTPATIENT)
Age: 85
End: 2020-11-23

## 2020-11-23 DIAGNOSIS — R91.8 OTHER NONSPECIFIC ABNORMAL FINDING OF LUNG FIELD: ICD-10-CM

## 2020-11-23 LAB — GLUCOSE BLDC GLUCOMTR-MCNC: 69 MG/DL — LOW (ref 70–99)

## 2020-11-23 PROCEDURE — 78815 PET IMAGE W/CT SKULL-THIGH: CPT | Mod: 26,PI

## 2020-11-24 ENCOUNTER — APPOINTMENT (OUTPATIENT)
Dept: INFUSION THERAPY | Facility: CLINIC | Age: 85
End: 2020-11-24

## 2020-11-24 RX ORDER — IRON SUCROSE 20 MG/ML
200 INJECTION, SOLUTION INTRAVENOUS ONCE
Refills: 0 | Status: COMPLETED | OUTPATIENT
Start: 2020-11-24 | End: 2020-11-24

## 2020-11-24 RX ADMIN — IRON SUCROSE 200 MILLIGRAM(S): 20 INJECTION, SOLUTION INTRAVENOUS at 14:05

## 2020-11-24 RX ADMIN — IRON SUCROSE 220 MILLIGRAM(S): 20 INJECTION, SOLUTION INTRAVENOUS at 13:35

## 2020-11-27 ENCOUNTER — APPOINTMENT (OUTPATIENT)
Dept: HEMATOLOGY ONCOLOGY | Facility: CLINIC | Age: 85
End: 2020-11-27
Payer: MEDICARE

## 2020-11-27 PROCEDURE — 99215 OFFICE O/P EST HI 40 MIN: CPT | Mod: 95

## 2020-11-27 NOTE — REASON FOR VISIT
[Follow-Up Visit] : a follow-up visit for [Home] : at home, [unfilled] , at the time of the visit. [Medical Office: ([facility])___] : at the medical office located in  [Other:____] : [unfilled] [Verbal consent obtained from patient] : the patient, [unfilled] [FreeTextEntry2] : HOMERO

## 2020-11-27 NOTE — HISTORY OF PRESENT ILLNESS
[de-identified] : 8/28/2020\par Patient is here for a follow-up visit for HOMERO, accompanied by family member.  Most recent CBC shows slight improvement in microcytic anemia, hgb now at 9.2g/dL.  Patient denies fever, chills, nausea, vomiting, pica or bleeding.  They report his appetite has improved slightly.  Spoke with patient at length regarding recent CT imaging which shows suspicious 8mm RUL nodule, pancreatic lesions and urinary bladder thickening.  However, the patient's family member states he had UroLift procedure on 8/6, just prior to recent imaging so this may be related.  Since last visit, they have also been seen by GI, Dr. Tamez, for capsule endoscopy.  \par CT C/A/P (8.28.2020) IMPRESSION:No thoracic lymphadenopathy.  8 mm speculated solid nodule in the apical right upper lobe. Short-term follow-up in 3 months, PET/CT or tissue sampling is recommended. Main pulmonary artery is borderline enlarged measuring approximately 3.2 cm which can be seen with pulmonary hypertension.  3.2 x 3.2 cm hypoattenuating pancreatic tail lesion may represent a multicystic septated lesion.  Additional hypoattenuating/cystic lesions in the pancreatic head. Recommend evaluation with pancreatic protocol MRI. Diffusely markedly trabeculated thick-walled urinary bladder. Urologic follow-up recommended and correlation with direct visualization as warranted. Prostatomegaly.  No lymphadenopathy identified in the abdomen or pelvis.\par \par 9/18/2020\par Patient is here for a follow-up visit for HOMERO, accompanied by family member.  He is s/p Venofer 200mg x 5 doses as of 8/28/2020.  Patient denies fever, chills, nausea, vomiting, pica or bleeding.  Spoke with patient regarding recent MR Abd which shows multicystic lesions in pancreatic tail and recommends biopsy.  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 No mural nodularity or solid components identified. EUS/FNA or imaging follow-up may be considered as clinically warranted. Additional scattered cystic lesions in the pancreas.\par \par 10/30/2020\par Patient is here for a follow-up visit for HOMERO, accompanied by family member.  Last Venofer x 5 doses completed in 8/2020.  Patient denies fever, chills, nausea, vomiting, pica or bleeding.  Reviewed most recent labwork which shows microcytic anemia with evidence of iron deficiency.  Spoke with patient regarding recent MR Abd which shows multicystic lesions in pancreatic tail and recommends biopsy.  \par \par 11.23.20\par pet/ct\par No pathologic FDG uptake to suggest biologic tumor activity. Specifically the 8 mm right upper lobe nodule is not FDG avid. 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 Recommend further MR surveillance. [de-identified] : Patient is a 83 yo M with PMHx of DM presents here today for establishment of care. He was referred here for Anemia. Blood work reviewed and shows that patient had a H/H of 13/41.3 in September 2019. He had a repeat in November 2019 which was 9.2/28 with MCV 84.2. Iron studies showed Iron <10, Ferritin 10.3 with 2% saturation. Patient had a colonoscopy and EGD done in November of 2019. Patient was poorly prepped for Colonoscopy and had one polyp removed. EGD was unremarkable. Since then, patient has had persistently decreasing hemoglobin levels with last hemoglobin level of 7.2 with MCV 64 in July 2020. He reports extreme fatigue but otherwise denies any shortness of breath, chest pain, palpitations, melena, or hematochezia. \par \par Other blood work revealed NL to elevated Folic Acid, Vitamin B12 of 152, Homocysteine 14

## 2020-11-27 NOTE — ASSESSMENT
[FreeTextEntry1] : 1. 84 yo M with  Iron Deficiency Anemia and suspect that this is secondary likely to GI Bleed; noted that in 2019, patient's colonoscopy was "poorly prepped."  patient noted to have low Vitamin B12 on previous blood work. \par - s/p capsule endoscopy as per pt; requested results \par - followup with GI, Dr. Tamez, as indicated; needs a repeat colonoscopy. \par - s/p Venofer 200mg x 5 doses as of 8/28/2020; we continue venofer as his levels are not normalized: rtc 5 weeks after the last dose\par \par #Prostatomegaly with thick-walled urinary bladder noted on CT from 08/2020\par - Urologic follow-up recommended, Dr. 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  Noted on 08/2020\par - Short-term follow-up in 3 months, PET/CT or tissue sampling is recommended (~11/2020): will defer now until he resolves pancreas issue and HOMERO issue and bladder issue: PET/CT Nov 2020 did not note hypermetabolic mass; explained that carcinoid or BALC may not present w/ an increased uptake on pet ; pt wants to repeat imaging in a few months and deferred pulmonary or thoracic consult\par \par \par R/O DVT: doppler: negative for dvt

## 2020-12-01 ENCOUNTER — LABORATORY RESULT (OUTPATIENT)
Age: 85
End: 2020-12-01

## 2020-12-01 ENCOUNTER — APPOINTMENT (OUTPATIENT)
Dept: INFUSION THERAPY | Facility: CLINIC | Age: 85
End: 2020-12-01

## 2020-12-01 LAB
HCT VFR BLD CALC: 31.5 %
HGB BLD-MCNC: 9 G/DL
MCHC RBC-ENTMCNC: 20.6 PG
MCHC RBC-ENTMCNC: 28.6 G/DL
MCV RBC AUTO: 72.2 FL
PLATELET # BLD AUTO: 244 K/UL
PMV BLD: 10.1 FL
RBC # BLD: 4.36 M/UL
RBC # FLD: 25.9 %
WBC # FLD AUTO: 7.06 K/UL

## 2020-12-01 RX ORDER — IRON SUCROSE 20 MG/ML
200 INJECTION, SOLUTION INTRAVENOUS ONCE
Refills: 0 | Status: COMPLETED | OUTPATIENT
Start: 2020-12-01 | End: 2020-12-01

## 2020-12-01 RX ADMIN — IRON SUCROSE 200 MILLIGRAM(S): 20 INJECTION, SOLUTION INTRAVENOUS at 15:50

## 2020-12-01 RX ADMIN — IRON SUCROSE 220 MILLIGRAM(S): 20 INJECTION, SOLUTION INTRAVENOUS at 15:17

## 2021-01-04 ENCOUNTER — LABORATORY RESULT (OUTPATIENT)
Age: 86
End: 2021-01-04

## 2021-01-04 ENCOUNTER — APPOINTMENT (OUTPATIENT)
Dept: HEMATOLOGY ONCOLOGY | Facility: CLINIC | Age: 86
End: 2021-01-04

## 2021-01-05 LAB
ALBUMIN SERPL ELPH-MCNC: 3.8 G/DL
ALP BLD-CCNC: 54 U/L
ALT SERPL-CCNC: 8 U/L
ANION GAP SERPL CALC-SCNC: 10 MMOL/L
AST SERPL-CCNC: 11 U/L
BILIRUB DIRECT SERPL-MCNC: <0.2 MG/DL
BILIRUB INDIRECT SERPL-MCNC: NORMAL MG/DL
BILIRUB SERPL-MCNC: <0.2 MG/DL
BUN SERPL-MCNC: 26 MG/DL
CALCIUM SERPL-MCNC: 8.5 MG/DL
CHLORIDE SERPL-SCNC: 104 MMOL/L
CO2 SERPL-SCNC: 26 MMOL/L
CREAT SERPL-MCNC: 1.3 MG/DL
FERRITIN SERPL-MCNC: 13 NG/ML
GLUCOSE SERPL-MCNC: 157 MG/DL
HCT VFR BLD CALC: 24.7 %
HGB BLD-MCNC: 7.2 G/DL
IRON SATN MFR SERPL: 6 %
IRON SERPL-MCNC: 17 UG/DL
MCHC RBC-ENTMCNC: 19.8 PG
MCHC RBC-ENTMCNC: 29.1 G/DL
MCV RBC AUTO: 68 FL
PLATELET # BLD AUTO: 226 K/UL
PMV BLD: 9.9 FL
POTASSIUM SERPL-SCNC: 4.6 MMOL/L
PROT SERPL-MCNC: 5.8 G/DL
RBC # BLD: 3.63 M/UL
RBC # FLD: 21.6 %
SODIUM SERPL-SCNC: 140 MMOL/L
TIBC SERPL-MCNC: 288 UG/DL
UIBC SERPL-MCNC: 271 UG/DL
WBC # FLD AUTO: 6.99 K/UL

## 2021-01-08 ENCOUNTER — APPOINTMENT (OUTPATIENT)
Dept: HEMATOLOGY ONCOLOGY | Facility: CLINIC | Age: 86
End: 2021-01-08
Payer: MEDICARE

## 2021-01-08 PROCEDURE — 99214 OFFICE O/P EST MOD 30 MIN: CPT | Mod: 95

## 2021-01-09 NOTE — ASSESSMENT
[FreeTextEntry1] : 1. 86 yo M with  Iron Deficiency Anemia and suspect that this is secondary likely to GI Bleed; noted that in 2019, patient's colonoscopy was "poorly prepped."  patient noted to have low Vitamin B12 on previous blood work. \par - s/p capsule endoscopy as per pt; requested results \par - followup with GI, Dr. Tamez, as indicated; needs a repeat colonoscopy. \par - s/p Venofer 200mg x 5 doses as of 8/28/2020; we continue venofer as his levels are not normalized: rtc 5 weeks after the last dose\par \par #Prostatomegaly with thick-walled urinary bladder noted on CT from 08/2020\par - Urologic follow-up recommended, Dr. 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  Noted on 08/2020\par - Short-term follow-up in 3 months, PET/CT or tissue sampling is recommended (~11/2020): will defer now until he resolves pancreas issue and HOMERO issue and bladder issue: PET/CT Nov 2020 did not note hypermetabolic mass; explained that carcinoid or BALC may not present w/ an increased uptake on pet ; pt wants to repeat imaging in a few months and deferred pulmonary or thoracic consult\par \par \par R/O DVT: doppler: negative for dvt\par \par 1.8.21: severe HOMERO despite prbc and recent completeion of IV Venofer: suspect active bleeding; pt is only very mildlly symptomatic; therefore will not send to ED and no PRBC at this moment; pt was advised to make an urgent aptt w/ GI Dr Tamez for further evaluation; restart IV VENOFER and f/u after that

## 2021-01-09 NOTE — REASON FOR VISIT
[Follow-Up Visit] : a follow-up visit for [Home] : at home, [unfilled] , at the time of the visit. [Medical Office: ([facility])___] : at the medical office located in  [Other:____] : [unfilled] [Verbal consent obtained from patient] : the patient, [unfilled] [FreeTextEntry2] : HOMERO

## 2021-01-09 NOTE — HISTORY OF PRESENT ILLNESS
[de-identified] : Patient is a 85 yo M with PMHx of DM presents here today for establishment of care. He was referred here for Anemia. Blood work reviewed and shows that patient had a H/H of 13/41.3 in September 2019. He had a repeat in November 2019 which was 9.2/28 with MCV 84.2. Iron studies showed Iron <10, Ferritin 10.3 with 2% saturation. Patient had a colonoscopy and EGD done in November of 2019. Patient was poorly prepped for Colonoscopy and had one polyp removed. EGD was unremarkable. Since then, patient has had persistently decreasing hemoglobin levels with last hemoglobin level of 7.2 with MCV 64 in July 2020. He reports extreme fatigue but otherwise denies any shortness of breath, chest pain, palpitations, melena, or hematochezia. \par \par Other blood work revealed NL to elevated Folic Acid, Vitamin B12 of 152, Homocysteine 14   [de-identified] : 8/28/2020\par Patient is here for a follow-up visit for HOMERO, accompanied by family member.  Most recent CBC shows slight improvement in microcytic anemia, hgb now at 9.2g/dL.  Patient denies fever, chills, nausea, vomiting, pica or bleeding.  They report his appetite has improved slightly.  Spoke with patient at length regarding recent CT imaging which shows suspicious 8mm RUL nodule, pancreatic lesions and urinary bladder thickening.  However, the patient's family member states he had UroLift procedure on 8/6, just prior to recent imaging so this may be related.  Since last visit, they have also been seen by GI, Dr. Tamez, for capsule endoscopy.  \par CT C/A/P (8.28.2020) IMPRESSION:No thoracic lymphadenopathy.  8 mm speculated solid nodule in the apical right upper lobe. Short-term follow-up in 3 months, PET/CT or tissue sampling is recommended. Main pulmonary artery is borderline enlarged measuring approximately 3.2 cm which can be seen with pulmonary hypertension.  3.2 x 3.2 cm hypoattenuating pancreatic tail lesion may represent a multicystic septated lesion.  Additional hypoattenuating/cystic lesions in the pancreatic head. Recommend evaluation with pancreatic protocol MRI. Diffusely markedly trabeculated thick-walled urinary bladder. Urologic follow-up recommended and correlation with direct visualization as warranted. Prostatomegaly.  No lymphadenopathy identified in the abdomen or pelvis.\par \par 9/18/2020\par Patient is here for a follow-up visit for HOMEOR, accompanied by family member.  He is s/p Venofer 200mg x 5 doses as of 8/28/2020.  Patient denies fever, chills, nausea, vomiting, pica or bleeding.  Spoke with patient regarding recent MR Abd which shows multicystic lesions in pancreatic tail and recommends biopsy.  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 No mural nodularity or solid components identified. EUS/FNA or imaging follow-up may be considered as clinically warranted. Additional scattered cystic lesions in the pancreas.\par \par 10/30/2020\par Patient is here for a follow-up visit for HOMERO, accompanied by family member.  Last Venofer x 5 doses completed in 8/2020.  Patient denies fever, chills, nausea, vomiting, pica or bleeding.  Reviewed most recent labwork which shows microcytic anemia with evidence of iron deficiency.  Spoke with patient regarding recent MR Abd which shows multicystic lesions in pancreatic tail and recommends biopsy.  \par \par 11.23.20\par pet/ct\par No pathologic FDG uptake to suggest biologic tumor activity. Specifically the 8 mm right upper lobe nodule is not FDG avid. 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 Recommend further MR surveillance.

## 2021-01-13 ENCOUNTER — OUTPATIENT (OUTPATIENT)
Dept: OUTPATIENT SERVICES | Facility: HOSPITAL | Age: 86
LOS: 1 days | Discharge: HOME | End: 2021-01-13

## 2021-01-13 ENCOUNTER — LABORATORY RESULT (OUTPATIENT)
Age: 86
End: 2021-01-13

## 2021-01-13 DIAGNOSIS — Z11.59 ENCOUNTER FOR SCREENING FOR OTHER VIRAL DISEASES: ICD-10-CM

## 2021-01-15 ENCOUNTER — APPOINTMENT (OUTPATIENT)
Dept: INFUSION THERAPY | Facility: CLINIC | Age: 86
End: 2021-01-15

## 2021-01-15 RX ORDER — IRON SUCROSE 20 MG/ML
200 INJECTION, SOLUTION INTRAVENOUS ONCE
Refills: 0 | Status: COMPLETED | OUTPATIENT
Start: 2021-01-15 | End: 2021-01-15

## 2021-01-15 RX ADMIN — IRON SUCROSE 220 MILLIGRAM(S): 20 INJECTION, SOLUTION INTRAVENOUS at 12:00

## 2021-01-15 RX ADMIN — IRON SUCROSE 200 MILLIGRAM(S): 20 INJECTION, SOLUTION INTRAVENOUS at 12:30

## 2021-01-22 ENCOUNTER — APPOINTMENT (OUTPATIENT)
Dept: INFUSION THERAPY | Facility: CLINIC | Age: 86
End: 2021-01-22

## 2021-01-22 RX ORDER — IRON SUCROSE 20 MG/ML
200 INJECTION, SOLUTION INTRAVENOUS ONCE
Refills: 0 | Status: COMPLETED | OUTPATIENT
Start: 2021-01-22 | End: 2021-01-22

## 2021-01-22 RX ADMIN — IRON SUCROSE 200 MILLIGRAM(S): 20 INJECTION, SOLUTION INTRAVENOUS at 12:30

## 2021-01-22 RX ADMIN — IRON SUCROSE 220 MILLIGRAM(S): 20 INJECTION, SOLUTION INTRAVENOUS at 12:00

## 2021-01-29 ENCOUNTER — APPOINTMENT (OUTPATIENT)
Dept: INFUSION THERAPY | Facility: CLINIC | Age: 86
End: 2021-01-29

## 2021-01-29 RX ORDER — IRON SUCROSE 20 MG/ML
200 INJECTION, SOLUTION INTRAVENOUS ONCE
Refills: 0 | Status: COMPLETED | OUTPATIENT
Start: 2021-01-29 | End: 2021-01-29

## 2021-01-29 RX ADMIN — IRON SUCROSE 220 MILLIGRAM(S): 20 INJECTION, SOLUTION INTRAVENOUS at 14:25

## 2021-01-29 RX ADMIN — IRON SUCROSE 200 MILLIGRAM(S): 20 INJECTION, SOLUTION INTRAVENOUS at 14:55

## 2021-02-05 ENCOUNTER — APPOINTMENT (OUTPATIENT)
Dept: INFUSION THERAPY | Facility: CLINIC | Age: 86
End: 2021-02-05

## 2021-02-05 RX ORDER — IRON SUCROSE 20 MG/ML
200 INJECTION, SOLUTION INTRAVENOUS ONCE
Refills: 0 | Status: COMPLETED | OUTPATIENT
Start: 2021-02-05 | End: 2021-02-05

## 2021-02-05 RX ADMIN — IRON SUCROSE 220 MILLIGRAM(S): 20 INJECTION, SOLUTION INTRAVENOUS at 11:39

## 2021-02-05 RX ADMIN — IRON SUCROSE 200 MILLIGRAM(S): 20 INJECTION, SOLUTION INTRAVENOUS at 12:10

## 2021-02-12 ENCOUNTER — APPOINTMENT (OUTPATIENT)
Dept: INFUSION THERAPY | Facility: CLINIC | Age: 86
End: 2021-02-12

## 2021-02-12 RX ORDER — IRON SUCROSE 20 MG/ML
200 INJECTION, SOLUTION INTRAVENOUS ONCE
Refills: 0 | Status: COMPLETED | OUTPATIENT
Start: 2021-02-12 | End: 2021-02-12

## 2021-02-12 RX ADMIN — IRON SUCROSE 200 MILLIGRAM(S): 20 INJECTION, SOLUTION INTRAVENOUS at 12:10

## 2021-02-12 RX ADMIN — IRON SUCROSE 220 MILLIGRAM(S): 20 INJECTION, SOLUTION INTRAVENOUS at 11:39

## 2021-03-17 ENCOUNTER — APPOINTMENT (OUTPATIENT)
Dept: HEMATOLOGY ONCOLOGY | Facility: CLINIC | Age: 86
End: 2021-03-17

## 2021-03-19 ENCOUNTER — APPOINTMENT (OUTPATIENT)
Dept: HEMATOLOGY ONCOLOGY | Facility: CLINIC | Age: 86
End: 2021-03-19
Payer: MEDICARE

## 2021-03-19 PROCEDURE — 99213 OFFICE O/P EST LOW 20 MIN: CPT | Mod: 95

## 2021-03-19 NOTE — ASSESSMENT
[FreeTextEntry1] : 1. 84 yo M with  Iron Deficiency Anemia and suspect that this is secondary likely to GI Bleed; noted that in 2019, patient's colonoscopy was "poorly prepped."  patient noted to have low Vitamin B12 on previous blood work. \par - s/p capsule endoscopy as per pt; requested results \par - followup with GI, Dr. Tamez, as indicated; needs a repeat colonoscopy. \par - s/p Venofer 200mg x 5 doses as of 8/28/2020; we continue venofer as his levels are not normalized: rtc 5 weeks after the last dose\par \par #Prostatomegaly with thick-walled urinary bladder noted on CT from 08/2020\par - Urologic follow-up recommended, Dr. 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  Noted on 08/2020\par - Short-term follow-up in 3 months, PET/CT or tissue sampling is recommended (~11/2020): will defer now until he resolves pancreas issue and HOMERO issue and bladder issue: PET/CT Nov 2020 did not note hypermetabolic mass; explained that carcinoid or BALC may not present w/ an increased uptake on pet ; pt wants to repeat imaging in a few months and deferred pulmonary or thoracic consult\par \par \par R/O DVT: doppler: negative for dvt\par \par 1.8.21: severe HOMERO despite prbc and recent completeion of IV Venofer: suspect active bleeding; pt is only very mildlly symptomatic; w/u by GI Dr Tamez: EGD and colonoscopy  were not remarkable: might need small bowel eval since he keep losing blood as assessed by persistently low iron stores after iron infusion: will restart iron IV

## 2021-03-19 NOTE — REASON FOR VISIT
[Follow-Up Visit] : a follow-up visit for [Home] : at home, [unfilled] , at the time of the visit. [Medical Office: ([facility])___] : at the medical office located in  [Other:____] : [unfilled] [Verbal consent obtained from patient] : the patient, [unfilled] [FreeTextEntry2] : HOMERO

## 2021-03-19 NOTE — HISTORY OF PRESENT ILLNESS
[de-identified] : Patient is a 85 yo M with PMHx of DM presents here today for establishment of care. He was referred here for Anemia. Blood work reviewed and shows that patient had a H/H of 13/41.3 in September 2019. He had a repeat in November 2019 which was 9.2/28 with MCV 84.2. Iron studies showed Iron <10, Ferritin 10.3 with 2% saturation. Patient had a colonoscopy and EGD done in November of 2019. Patient was poorly prepped for Colonoscopy and had one polyp removed. EGD was unremarkable. Since then, patient has had persistently decreasing hemoglobin levels with last hemoglobin level of 7.2 with MCV 64 in July 2020. He reports extreme fatigue but otherwise denies any shortness of breath, chest pain, palpitations, melena, or hematochezia. \par \par Other blood work revealed NL to elevated Folic Acid, Vitamin B12 of 152, Homocysteine 14   [de-identified] : 8/28/2020\par Patient is here for a follow-up visit for HOMERO, accompanied by family member.  Most recent CBC shows slight improvement in microcytic anemia, hgb now at 9.2g/dL.  Patient denies fever, chills, nausea, vomiting, pica or bleeding.  They report his appetite has improved slightly.  Spoke with patient at length regarding recent CT imaging which shows suspicious 8mm RUL nodule, pancreatic lesions and urinary bladder thickening.  However, the patient's family member states he had UroLift procedure on 8/6, just prior to recent imaging so this may be related.  Since last visit, they have also been seen by GI, Dr. Tamez, for capsule endoscopy.  \par CT C/A/P (8.28.2020) IMPRESSION:No thoracic lymphadenopathy.  8 mm speculated solid nodule in the apical right upper lobe. Short-term follow-up in 3 months, PET/CT or tissue sampling is recommended. Main pulmonary artery is borderline enlarged measuring approximately 3.2 cm which can be seen with pulmonary hypertension.  3.2 x 3.2 cm hypoattenuating pancreatic tail lesion may represent a multicystic septated lesion.  Additional hypoattenuating/cystic lesions in the pancreatic head. Recommend evaluation with pancreatic protocol MRI. Diffusely markedly trabeculated thick-walled urinary bladder. Urologic follow-up recommended and correlation with direct visualization as warranted. Prostatomegaly.  No lymphadenopathy identified in the abdomen or pelvis.\par \par 9/18/2020\par Patient is here for a follow-up visit for HOEMRO, accompanied by family member.  He is s/p Venofer 200mg x 5 doses as of 8/28/2020.  Patient denies fever, chills, nausea, vomiting, pica or bleeding.  Spoke with patient regarding recent MR Abd which shows multicystic lesions in pancreatic tail and recommends biopsy.  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 No mural nodularity or solid components identified. EUS/FNA or imaging follow-up may be considered as clinically warranted. Additional scattered cystic lesions in the pancreas.\par \par 10/30/2020\par Patient is here for a follow-up visit for HOMERO, accompanied by family member.  Last Venofer x 5 doses completed in 8/2020.  Patient denies fever, chills, nausea, vomiting, pica or bleeding.  Reviewed most recent labwork which shows microcytic anemia with evidence of iron deficiency.  Spoke with patient regarding recent MR Abd which shows multicystic lesions in pancreatic tail and recommends biopsy.  \par \par 11.23.20\par pet/ct\par No pathologic FDG uptake to suggest biologic tumor activity. Specifically the 8 mm right upper lobe nodule is not FDG avid. 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 Recommend further MR surveillance.

## 2021-03-24 ENCOUNTER — OUTPATIENT (OUTPATIENT)
Dept: OUTPATIENT SERVICES | Facility: HOSPITAL | Age: 86
LOS: 1 days | Discharge: HOME | End: 2021-03-24

## 2021-03-24 ENCOUNTER — LABORATORY RESULT (OUTPATIENT)
Age: 86
End: 2021-03-24

## 2021-03-24 DIAGNOSIS — Z11.59 ENCOUNTER FOR SCREENING FOR OTHER VIRAL DISEASES: ICD-10-CM

## 2021-03-26 ENCOUNTER — APPOINTMENT (OUTPATIENT)
Dept: INFUSION THERAPY | Facility: CLINIC | Age: 86
End: 2021-03-26

## 2021-03-26 RX ORDER — IRON SUCROSE 20 MG/ML
200 INJECTION, SOLUTION INTRAVENOUS ONCE
Refills: 0 | Status: COMPLETED | OUTPATIENT
Start: 2021-03-26 | End: 2021-03-26

## 2021-03-26 RX ADMIN — IRON SUCROSE 220 MILLIGRAM(S): 20 INJECTION, SOLUTION INTRAVENOUS at 15:06

## 2021-04-02 ENCOUNTER — OUTPATIENT (OUTPATIENT)
Dept: OUTPATIENT SERVICES | Facility: HOSPITAL | Age: 86
LOS: 1 days | Discharge: HOME | End: 2021-04-02

## 2021-04-02 ENCOUNTER — APPOINTMENT (OUTPATIENT)
Dept: INFUSION THERAPY | Facility: CLINIC | Age: 86
End: 2021-04-02

## 2021-04-02 DIAGNOSIS — D50.9 IRON DEFICIENCY ANEMIA, UNSPECIFIED: ICD-10-CM

## 2021-04-02 RX ORDER — IRON SUCROSE 20 MG/ML
200 INJECTION, SOLUTION INTRAVENOUS ONCE
Refills: 0 | Status: COMPLETED | OUTPATIENT
Start: 2021-04-02 | End: 2021-04-02

## 2021-04-02 RX ADMIN — IRON SUCROSE 200 MILLIGRAM(S): 20 INJECTION, SOLUTION INTRAVENOUS at 16:50

## 2021-04-02 RX ADMIN — IRON SUCROSE 220 MILLIGRAM(S): 20 INJECTION, SOLUTION INTRAVENOUS at 16:18

## 2021-04-09 ENCOUNTER — APPOINTMENT (OUTPATIENT)
Dept: INFUSION THERAPY | Facility: CLINIC | Age: 86
End: 2021-04-09

## 2021-04-09 RX ORDER — IRON SUCROSE 20 MG/ML
200 INJECTION, SOLUTION INTRAVENOUS ONCE
Refills: 0 | Status: COMPLETED | OUTPATIENT
Start: 2021-04-09 | End: 2021-04-09

## 2021-04-09 RX ADMIN — IRON SUCROSE 200 MILLIGRAM(S): 20 INJECTION, SOLUTION INTRAVENOUS at 17:10

## 2021-04-09 RX ADMIN — IRON SUCROSE 220 MILLIGRAM(S): 20 INJECTION, SOLUTION INTRAVENOUS at 16:40

## 2021-04-16 ENCOUNTER — APPOINTMENT (OUTPATIENT)
Dept: INFUSION THERAPY | Facility: CLINIC | Age: 86
End: 2021-04-16

## 2021-04-16 RX ORDER — IRON SUCROSE 20 MG/ML
200 INJECTION, SOLUTION INTRAVENOUS ONCE
Refills: 0 | Status: COMPLETED | OUTPATIENT
Start: 2021-04-16 | End: 2021-04-16

## 2021-04-16 RX ADMIN — IRON SUCROSE 220 MILLIGRAM(S): 20 INJECTION, SOLUTION INTRAVENOUS at 15:20

## 2021-04-23 ENCOUNTER — APPOINTMENT (OUTPATIENT)
Dept: INFUSION THERAPY | Facility: CLINIC | Age: 86
End: 2021-04-23

## 2021-04-23 RX ORDER — IRON SUCROSE 20 MG/ML
200 INJECTION, SOLUTION INTRAVENOUS ONCE
Refills: 0 | Status: COMPLETED | OUTPATIENT
Start: 2021-04-23 | End: 2021-04-23

## 2021-04-23 RX ADMIN — IRON SUCROSE 200 MILLIGRAM(S): 20 INJECTION, SOLUTION INTRAVENOUS at 16:01

## 2021-04-23 RX ADMIN — IRON SUCROSE 220 MILLIGRAM(S): 20 INJECTION, SOLUTION INTRAVENOUS at 15:31

## 2021-05-26 ENCOUNTER — LABORATORY RESULT (OUTPATIENT)
Age: 86
End: 2021-05-26

## 2021-05-26 ENCOUNTER — APPOINTMENT (OUTPATIENT)
Dept: HEMATOLOGY ONCOLOGY | Facility: CLINIC | Age: 86
End: 2021-05-26

## 2021-05-27 LAB
FERRITIN SERPL-MCNC: 15 NG/ML
HCT VFR BLD CALC: 27.5 %
HGB BLD-MCNC: 8.3 G/DL
IRON SATN MFR SERPL: 7 %
IRON SERPL-MCNC: 20 UG/DL
MCHC RBC-ENTMCNC: 20 PG
MCHC RBC-ENTMCNC: 30.2 G/DL
MCV RBC AUTO: 66.3 FL
PLATELET # BLD AUTO: 209 K/UL
PMV BLD: 10.2 FL
RBC # BLD: 4.15 M/UL
RBC # FLD: 21.1 %
TIBC SERPL-MCNC: 296 UG/DL
UIBC SERPL-MCNC: 276 UG/DL
WBC # FLD AUTO: 5.97 K/UL

## 2021-05-28 ENCOUNTER — APPOINTMENT (OUTPATIENT)
Dept: HEMATOLOGY ONCOLOGY | Facility: CLINIC | Age: 86
End: 2021-05-28
Payer: MEDICARE

## 2021-05-28 VITALS
HEART RATE: 72 BPM | TEMPERATURE: 97.1 F | SYSTOLIC BLOOD PRESSURE: 138 MMHG | RESPIRATION RATE: 16 BRPM | WEIGHT: 197 LBS | HEIGHT: 69 IN | DIASTOLIC BLOOD PRESSURE: 63 MMHG | BODY MASS INDEX: 29.18 KG/M2

## 2021-05-28 PROCEDURE — 99215 OFFICE O/P EST HI 40 MIN: CPT

## 2021-05-28 NOTE — REVIEW OF SYSTEMS
[Fatigue] : fatigue [Muscle Weakness] : muscle weakness [Fever] : no fever [Chills] : no chills [Night Sweats] : no night sweats [FreeTextEntry8] : Pt has known enlarged prostate

## 2021-05-28 NOTE — HISTORY OF PRESENT ILLNESS
[de-identified] : Patient is a 83 yo M with PMHx of DM presents here today for establishment of care. He was referred here for Anemia. Blood work reviewed and shows that patient had a H/H of 13/41.3 in September 2019. He had a repeat in November 2019 which was 9.2/28 with MCV 84.2. Iron studies showed Iron <10, Ferritin 10.3 with 2% saturation. Patient had a colonoscopy and EGD done in November of 2019. Patient was poorly prepped for Colonoscopy and had one polyp removed. EGD was unremarkable. Since then, patient has had persistently decreasing hemoglobin levels with last hemoglobin level of 7.2 with MCV 64 in July 2020. He reports extreme fatigue but otherwise denies any shortness of breath, chest pain, palpitations, melena, or hematochezia. \par \par Other blood work revealed NL to elevated Folic Acid, Vitamin B12 of 152, Homocysteine 14   [de-identified] : 8/28/2020\par Patient is here for a follow-up visit for HOMERO, accompanied by family member.  Most recent CBC shows slight improvement in microcytic anemia, hgb now at 9.2g/dL.  Patient denies fever, chills, nausea, vomiting, pica or bleeding.  They report his appetite has improved slightly.  Spoke with patient at length regarding recent CT imaging which shows suspicious 8mm RUL nodule, pancreatic lesions and urinary bladder thickening.  However, the patient's family member states he had UroLift procedure on 8/6, just prior to recent imaging so this may be related.  Since last visit, they have also been seen by GI, Dr. Tamez, for capsule endoscopy.  \par CT C/A/P (8.28.2020) IMPRESSION:No thoracic lymphadenopathy.  8 mm speculated solid nodule in the apical right upper lobe. Short-term follow-up in 3 months, PET/CT or tissue sampling is recommended. Main pulmonary artery is borderline enlarged measuring approximately 3.2 cm which can be seen with pulmonary hypertension.  3.2 x 3.2 cm hypoattenuating pancreatic tail lesion may represent a multicystic septated lesion.  Additional hypoattenuating/cystic lesions in the pancreatic head. Recommend evaluation with pancreatic protocol MRI. Diffusely markedly trabeculated thick-walled urinary bladder. Urologic follow-up recommended and correlation with direct visualization as warranted. Prostatomegaly.  No lymphadenopathy identified in the abdomen or pelvis.\par \par 9/18/2020\par Patient is here for a follow-up visit for HOMERO, accompanied by family member.  He is s/p Venofer 200mg x 5 doses as of 8/28/2020.  Patient denies fever, chills, nausea, vomiting, pica or bleeding.  Spoke with patient regarding recent MR Abd which shows multicystic lesions in pancreatic tail and recommends biopsy.  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 No mural nodularity or solid components identified. EUS/FNA or imaging follow-up may be considered as clinically warranted. Additional scattered cystic lesions in the pancreas.\par \par 10/30/2020\par Patient is here for a follow-up visit for HOMERO, accompanied by family member.  Last Venofer x 5 doses completed in 8/2020.  Patient denies fever, chills, nausea, vomiting, pica or bleeding.  Reviewed most recent labwork which shows microcytic anemia with evidence of iron deficiency.  Spoke with patient regarding recent MR Abd which shows multicystic lesions in pancreatic tail and recommends biopsy.  \par \par 11.23.20\par pet/ct\par No pathologic FDG uptake to suggest biologic tumor activity. Specifically the 8 mm right upper lobe nodule is not FDG avid. 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 Recommend further MR surveillance.\par \par \par 5/28/2021- pt here for follow up for HOMERO and pancreatic tail lesion/pulmonary nodule on imaging.  He is s/p venofer x 5 todays  cbc  HGB 8.3 HCT 27.5 iron 20 sat 7% Ferritin 15.   Patient feeling a lot of fatigue and weakness.  Has been seen by Dr. Tamez had endoscopy and colonoscopy march 2021 along with capsule endoscopy as well unable to locate source of bleeding.

## 2021-05-28 NOTE — ASSESSMENT
[FreeTextEntry1] : 1. 86 yo M with  Iron Deficiency Anemia and suspect that this is secondary likely to GI Bleed; noted that in 2019, patient's colonoscopy was "poorly prepped."  patient noted to have low Vitamin B12 on previous blood work. \par - s/p full GI w/u including capsule endoscopy as per pt; \par - followup with GI, Dr. Tamez, as indicated; needs a repeat colonoscopy. \par - s/p Venofer 200mg x 5 doses as of 8/28/2020, 2/12/21 + 4/23/21\par - w/u by GI Dr Tamez: EGD and colonoscopy  were not remarkable\par - patient continues  losing blood as assessed by persistently low iron stores after iron infusion: \par - will T+C on 6.1.2021 for transfusion for 2 units PRBC on 6.4.2021, consent obtained\par - will also order another venofer once weekly x 5 \par - recommend followup with URO for workup since GI workup unrevealing so far\par \par #Prostatomegaly with thick-walled urinary bladder noted on CT from 08/2020\par - Urologic follow-up recommended, Dr. 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  Noted on 08/2020\par - Short-term follow-up in 3 months, PET/CT or tissue sampling is recommended (~11/2020): will defer now until he resolves pancreas issue and HOMERO issue and bladder issue: PET/CT Nov 2020 did not note hypermetabolic mass; explained that carcinoid or BALC may not present w/ an increased uptake on pet ; pt wants to repeat imaging in a few months and deferred pulmonary or thoracic consult\par \par RTC 5 weeks post final venofer with CBC, iron studies, ferritin 1-2 days prior\par \par seen/examined w/ NP Lora\par 86 yo man with severe symptomatic unexplained iron deficiency anemia: Had a prolonged discussion with him and his daughter, who appears thankful but frustrated: Complete GI w/u with EGD, colonoscopy , small bowel series were not remarkable for bleeding; also  evaluation was not remarkable. He has been POORLY responding to IV venofer replaacment. Agreed to repeat PRBC x2 and venofer 1000mg; will repeat BMP: if poor GFR, will start on procrit inj following IV venofer \par \par Pancreas mass:daughter is open to repeat PET/CT; HOWEVER, if the mass increases in size, she is strongly against further investigation and w/u \par \par Lung mass: again open to PET/CT , but will decide with her son, ICU/pulmonary attending, the further approach

## 2021-06-01 ENCOUNTER — APPOINTMENT (OUTPATIENT)
Dept: HEMATOLOGY ONCOLOGY | Facility: CLINIC | Age: 86
End: 2021-06-01

## 2021-06-01 ENCOUNTER — LABORATORY RESULT (OUTPATIENT)
Age: 86
End: 2021-06-01

## 2021-06-01 LAB
HCT VFR BLD CALC: 28.1 %
HGB BLD-MCNC: 8.2 G/DL
MCHC RBC-ENTMCNC: 19.6 PG
MCHC RBC-ENTMCNC: 29.2 G/DL
MCV RBC AUTO: 67.1 FL
PLATELET # BLD AUTO: 226 K/UL
PMV BLD: 9.9 FL
RBC # BLD: 4.19 M/UL
RBC # FLD: 21.2 %
RETICS # AUTO: 1.4 %
RETICS AGGREG/RBC NFR: 58.2 K/UL
WBC # FLD AUTO: 6.52 K/UL

## 2021-06-02 LAB
ALBUMIN SERPL ELPH-MCNC: 4 G/DL
ALP BLD-CCNC: 46 U/L
ALT SERPL-CCNC: 8 U/L
AST SERPL-CCNC: 14 U/L
BILIRUB DIRECT SERPL-MCNC: <0.2 MG/DL
BILIRUB INDIRECT SERPL-MCNC: NORMAL MG/DL
BILIRUB SERPL-MCNC: <0.2 MG/DL
BLD GP AB SCN SERPL QL: NORMAL
DIRECT COOMBS: NORMAL
FOLATE SERPL-MCNC: >20 NG/ML
HAPTOGLOB SERPL-MCNC: 169 MG/DL
LDH SERPL-CCNC: 189 U/L
PROT SERPL-MCNC: 6.1 G/DL
VIT B12 SERPL-MCNC: 692 PG/ML

## 2021-06-04 ENCOUNTER — APPOINTMENT (OUTPATIENT)
Dept: INFUSION THERAPY | Facility: CLINIC | Age: 86
End: 2021-06-04

## 2021-06-04 ENCOUNTER — LABORATORY RESULT (OUTPATIENT)
Age: 86
End: 2021-06-04

## 2021-06-04 LAB
HCT VFR BLD CALC: 27.3 %
HGB BLD-MCNC: 8.1 G/DL
MCHC RBC-ENTMCNC: 19.8 PG
MCHC RBC-ENTMCNC: 29.7 G/DL
MCV RBC AUTO: 66.6 FL
PLATELET # BLD AUTO: 112 K/UL
RBC # BLD: 4.1 M/UL
RBC # FLD: 21.5 %
WBC # FLD AUTO: 6.26 K/UL

## 2021-06-04 RX ORDER — IRON SUCROSE 20 MG/ML
200 INJECTION, SOLUTION INTRAVENOUS ONCE
Refills: 0 | Status: COMPLETED | OUTPATIENT
Start: 2021-06-04 | End: 2021-06-04

## 2021-06-04 RX ADMIN — IRON SUCROSE 220 MILLIGRAM(S): 20 INJECTION, SOLUTION INTRAVENOUS at 15:00

## 2021-06-04 RX ADMIN — IRON SUCROSE 200 MILLIGRAM(S): 20 INJECTION, SOLUTION INTRAVENOUS at 15:30

## 2021-06-08 LAB — METHYLMALONATE SERPL-SCNC: 102 NMOL/L

## 2021-06-11 ENCOUNTER — LABORATORY RESULT (OUTPATIENT)
Age: 86
End: 2021-06-11

## 2021-06-11 ENCOUNTER — APPOINTMENT (OUTPATIENT)
Dept: INFUSION THERAPY | Facility: CLINIC | Age: 86
End: 2021-06-11

## 2021-06-11 LAB
HCT VFR BLD CALC: 30.6 %
HGB BLD-MCNC: 9.2 G/DL
MCHC RBC-ENTMCNC: 20.6 PG
MCHC RBC-ENTMCNC: 30.1 G/DL
MCV RBC AUTO: 68.6 FL
PLATELET # BLD AUTO: 135 K/UL
PMV BLD: NORMAL
RBC # BLD: 4.46 M/UL
RBC # FLD: 24.3 %
WBC # FLD AUTO: 6.74 K/UL

## 2021-06-11 RX ORDER — IRON SUCROSE 20 MG/ML
200 INJECTION, SOLUTION INTRAVENOUS ONCE
Refills: 0 | Status: COMPLETED | OUTPATIENT
Start: 2021-06-11 | End: 2021-06-11

## 2021-06-11 RX ADMIN — IRON SUCROSE 200 MILLIGRAM(S): 20 INJECTION, SOLUTION INTRAVENOUS at 13:10

## 2021-06-11 RX ADMIN — IRON SUCROSE 220 MILLIGRAM(S): 20 INJECTION, SOLUTION INTRAVENOUS at 12:39

## 2021-06-18 ENCOUNTER — APPOINTMENT (OUTPATIENT)
Dept: INFUSION THERAPY | Facility: CLINIC | Age: 86
End: 2021-06-18

## 2021-06-18 RX ORDER — IRON SUCROSE 20 MG/ML
200 INJECTION, SOLUTION INTRAVENOUS ONCE
Refills: 0 | Status: COMPLETED | OUTPATIENT
Start: 2021-06-18 | End: 2021-06-18

## 2021-06-18 RX ADMIN — IRON SUCROSE 200 MILLIGRAM(S): 20 INJECTION, SOLUTION INTRAVENOUS at 14:39

## 2021-06-18 RX ADMIN — IRON SUCROSE 220 MILLIGRAM(S): 20 INJECTION, SOLUTION INTRAVENOUS at 14:08

## 2021-06-25 ENCOUNTER — APPOINTMENT (OUTPATIENT)
Dept: INFUSION THERAPY | Facility: CLINIC | Age: 86
End: 2021-06-25

## 2021-06-25 RX ORDER — IRON SUCROSE 20 MG/ML
200 INJECTION, SOLUTION INTRAVENOUS ONCE
Refills: 0 | Status: COMPLETED | OUTPATIENT
Start: 2021-06-25 | End: 2021-06-25

## 2021-06-25 RX ADMIN — IRON SUCROSE 220 MILLIGRAM(S): 20 INJECTION, SOLUTION INTRAVENOUS at 13:40

## 2021-07-02 ENCOUNTER — APPOINTMENT (OUTPATIENT)
Dept: INFUSION THERAPY | Facility: CLINIC | Age: 86
End: 2021-07-02

## 2021-07-02 ENCOUNTER — OUTPATIENT (OUTPATIENT)
Dept: OUTPATIENT SERVICES | Facility: HOSPITAL | Age: 86
LOS: 1 days | Discharge: HOME | End: 2021-07-02

## 2021-07-02 VITALS
TEMPERATURE: 97.6 F | HEART RATE: 58 BPM | RESPIRATION RATE: 16 BRPM | DIASTOLIC BLOOD PRESSURE: 75 MMHG | SYSTOLIC BLOOD PRESSURE: 182 MMHG

## 2021-07-02 DIAGNOSIS — R91.8 OTHER NONSPECIFIC ABNORMAL FINDING OF LUNG FIELD: ICD-10-CM

## 2021-07-02 DIAGNOSIS — D50.9 IRON DEFICIENCY ANEMIA, UNSPECIFIED: ICD-10-CM

## 2021-07-02 RX ORDER — IRON SUCROSE 20 MG/ML
200 INJECTION, SOLUTION INTRAVENOUS ONCE
Refills: 0 | Status: COMPLETED | OUTPATIENT
Start: 2021-07-02 | End: 2021-07-02

## 2021-07-02 RX ADMIN — IRON SUCROSE 220 MILLIGRAM(S): 20 INJECTION, SOLUTION INTRAVENOUS at 14:25

## 2021-07-12 ENCOUNTER — APPOINTMENT (OUTPATIENT)
Dept: INFUSION THERAPY | Facility: CLINIC | Age: 86
End: 2021-07-12

## 2021-08-06 ENCOUNTER — APPOINTMENT (OUTPATIENT)
Dept: HEMATOLOGY ONCOLOGY | Facility: CLINIC | Age: 86
End: 2021-08-06

## 2021-08-06 ENCOUNTER — LABORATORY RESULT (OUTPATIENT)
Age: 86
End: 2021-08-06

## 2021-08-06 LAB
HCT VFR BLD CALC: 31.4 %
HGB BLD-MCNC: 9.7 G/DL
MCHC RBC-ENTMCNC: 23.2 PG
MCHC RBC-ENTMCNC: 30.9 G/DL
MCV RBC AUTO: 74.9 FL
PLATELET # BLD AUTO: 226 K/UL
PMV BLD: 9.5 FL
RBC # BLD: 4.19 M/UL
RBC # FLD: 21.4 %
WBC # FLD AUTO: 7.56 K/UL

## 2021-08-09 LAB
FERRITIN SERPL-MCNC: 48 NG/ML
IRON SATN MFR SERPL: 12 %
IRON SERPL-MCNC: 33 UG/DL
TIBC SERPL-MCNC: 271 UG/DL
UIBC SERPL-MCNC: 238 UG/DL

## 2021-08-13 ENCOUNTER — APPOINTMENT (OUTPATIENT)
Dept: HEMATOLOGY ONCOLOGY | Facility: CLINIC | Age: 86
End: 2021-08-13
Payer: MEDICARE

## 2021-08-13 VITALS
DIASTOLIC BLOOD PRESSURE: 60 MMHG | RESPIRATION RATE: 16 BRPM | HEART RATE: 60 BPM | SYSTOLIC BLOOD PRESSURE: 132 MMHG | HEIGHT: 69 IN | WEIGHT: 190 LBS | BODY MASS INDEX: 28.14 KG/M2 | TEMPERATURE: 97.3 F

## 2021-08-13 PROCEDURE — 99214 OFFICE O/P EST MOD 30 MIN: CPT

## 2021-08-13 NOTE — ASSESSMENT
[FreeTextEntry1] : 1. 86 yo M with  Iron Deficiency Anemia and suspect that this is secondary likely to GI Bleed; noted that in 2019, patient's colonoscopy was "poorly prepped."  patient noted to have low Vitamin B12 on previous blood work. \par - s/p full GI w/u including capsule endoscopy as per pt; \par - followup with GI, Dr. Tamez, as indicated; needs a repeat colonoscopy. \par - s/p Venofer 200mg x 5 doses as of 8/28/2020, 2/12/21 + 4/23/21\par - w/u by GI Dr Tamez: EGD and colonoscopy  were not remarkable\par - patient continues  losing blood as assessed by persistently low iron stores after iron infusion: \par - will T+C on 6.1.2021 for transfusion for 2 units PRBC on 6.4.2021, consent obtained\par - will also order another venofer once weekly x 5 \par - recommend followup with URO for workup since GI workup unrevealing so far\par \par #Prostatomegaly with thick-walled urinary bladder noted on CT from 08/2020\par - Urologic follow-up recommended, Dr. 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  Noted on 08/2020\par - Short-term follow-up in 3 months, PET/CT or tissue sampling is recommended (~11/2020): will defer now until he resolves pancreas issue and HOMERO issue and bladder issue: PET/CT Nov 2020 did not note hypermetabolic mass; explained that carcinoid or BALC may not present w/ an increased uptake on pet ; pt wants to repeat imaging in a few months and deferred pulmonary or thoracic consult\par \par \par \par \par - agreed NOT TO REPEAT PET SCAN as per request of pt and his daughter; his grandson is an ICU ATTENDING\par - hold iron as ferritin increased to 48\par - will repeat labs in one month

## 2021-08-13 NOTE — REVIEW OF SYSTEMS
[Fatigue] : fatigue [Incontinence] : incontinence [Muscle Weakness] : muscle weakness [Negative] : Gastrointestinal [Fever] : no fever [Chills] : no chills [Night Sweats] : no night sweats [FreeTextEntry8] : Pt has known enlarged prostate

## 2021-08-13 NOTE — HISTORY OF PRESENT ILLNESS
[de-identified] : Patient is a 85 yo M with PMHx of DM presents here today for establishment of care. He was referred here for Anemia. Blood work reviewed and shows that patient had a H/H of 13/41.3 in September 2019. He had a repeat in November 2019 which was 9.2/28 with MCV 84.2. Iron studies showed Iron <10, Ferritin 10.3 with 2% saturation. Patient had a colonoscopy and EGD done in November of 2019. Patient was poorly prepped for Colonoscopy and had one polyp removed. EGD was unremarkable. Since then, patient has had persistently decreasing hemoglobin levels with last hemoglobin level of 7.2 with MCV 64 in July 2020. He reports extreme fatigue but otherwise denies any shortness of breath, chest pain, palpitations, melena, or hematochezia. \par \par Other blood work revealed NL to elevated Folic Acid, Vitamin B12 of 152, Homocysteine 14   [de-identified] : 8/28/2020\par Patient is here for a follow-up visit for HOMERO, accompanied by family member.  Most recent CBC shows slight improvement in microcytic anemia, hgb now at 9.2g/dL.  Patient denies fever, chills, nausea, vomiting, pica or bleeding.  They report his appetite has improved slightly.  Spoke with patient at length regarding recent CT imaging which shows suspicious 8mm RUL nodule, pancreatic lesions and urinary bladder thickening.  However, the patient's family member states he had UroLift procedure on 8/6, just prior to recent imaging so this may be related.  Since last visit, they have also been seen by GI, Dr. Tamez, for capsule endoscopy.  \par CT C/A/P (8.28.2020) IMPRESSION:No thoracic lymphadenopathy.  8 mm speculated solid nodule in the apical right upper lobe. Short-term follow-up in 3 months, PET/CT or tissue sampling is recommended. Main pulmonary artery is borderline enlarged measuring approximately 3.2 cm which can be seen with pulmonary hypertension.  3.2 x 3.2 cm hypoattenuating pancreatic tail lesion may represent a multicystic septated lesion.  Additional hypoattenuating/cystic lesions in the pancreatic head. Recommend evaluation with pancreatic protocol MRI. Diffusely markedly trabeculated thick-walled urinary bladder. Urologic follow-up recommended and correlation with direct visualization as warranted. Prostatomegaly.  No lymphadenopathy identified in the abdomen or pelvis.\par \par 9/18/2020\par Patient is here for a follow-up visit for HOMERO, accompanied by family member.  He is s/p Venofer 200mg x 5 doses as of 8/28/2020.  Patient denies fever, chills, nausea, vomiting, pica or bleeding.  Spoke with patient regarding recent MR Abd which shows multicystic lesions in pancreatic tail and recommends biopsy.  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 No mural nodularity or solid components identified. EUS/FNA or imaging follow-up may be considered as clinically warranted. Additional scattered cystic lesions in the pancreas.\par \par 10/30/2020\par Patient is here for a follow-up visit for HOMERO, accompanied by family member.  Last Venofer x 5 doses completed in 8/2020.  Patient denies fever, chills, nausea, vomiting, pica or bleeding.  Reviewed most recent labwork which shows microcytic anemia with evidence of iron deficiency.  Spoke with patient regarding recent MR Abd which shows multicystic lesions in pancreatic tail and recommends biopsy.  \par \par 11.23.20\par pet/ct\par No pathologic FDG uptake to suggest biologic tumor activity. Specifically the 8 mm right upper lobe nodule is not FDG avid. 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 Recommend further MR surveillance.\par \par \par 5/28/2021- pt here for follow up for HOMERO and pancreatic tail lesion/pulmonary nodule on imaging.  He is s/p venofer x 5 todays  cbc  HGB 8.3 HCT 27.5 iron 20 sat 7% Ferritin 15.   Patient feeling a lot of fatigue and weakness.  Has been seen by Dr. Tamez had endoscopy and colonoscopy march 2021 along with capsule endoscopy as well unable to locate source of bleeding.

## 2021-09-20 ENCOUNTER — LABORATORY RESULT (OUTPATIENT)
Age: 86
End: 2021-09-20

## 2021-09-20 ENCOUNTER — APPOINTMENT (OUTPATIENT)
Dept: HEMATOLOGY ONCOLOGY | Facility: CLINIC | Age: 86
End: 2021-09-20

## 2021-09-22 ENCOUNTER — APPOINTMENT (OUTPATIENT)
Dept: HEMATOLOGY ONCOLOGY | Facility: CLINIC | Age: 86
End: 2021-09-22
Payer: MEDICARE

## 2021-09-22 VITALS
DIASTOLIC BLOOD PRESSURE: 62 MMHG | HEIGHT: 69 IN | BODY MASS INDEX: 29.47 KG/M2 | TEMPERATURE: 96.5 F | SYSTOLIC BLOOD PRESSURE: 137 MMHG | HEART RATE: 71 BPM | WEIGHT: 199 LBS | RESPIRATION RATE: 16 BRPM

## 2021-09-22 LAB
ALBUMIN SERPL ELPH-MCNC: 4 G/DL
ALP BLD-CCNC: 58 U/L
ALT SERPL-CCNC: 12 U/L
ANION GAP SERPL CALC-SCNC: 12 MMOL/L
AST SERPL-CCNC: 13 U/L
BILIRUB DIRECT SERPL-MCNC: <0.2 MG/DL
BILIRUB INDIRECT SERPL-MCNC: NORMAL MG/DL
BILIRUB SERPL-MCNC: <0.2 MG/DL
BUN SERPL-MCNC: 33 MG/DL
CALCIUM SERPL-MCNC: 8.8 MG/DL
CHLORIDE SERPL-SCNC: 104 MMOL/L
CO2 SERPL-SCNC: 28 MMOL/L
CREAT SERPL-MCNC: 1.2 MG/DL
FERRITIN SERPL-MCNC: 6 NG/ML
FOLATE SERPL-MCNC: >20 NG/ML
GLUCOSE SERPL-MCNC: 76 MG/DL
HCT VFR BLD CALC: 27.5 %
HGB BLD-MCNC: 8 G/DL
IRON SATN MFR SERPL: 6 %
IRON SERPL-MCNC: 17 UG/DL
MCHC RBC-ENTMCNC: 20.6 PG
MCHC RBC-ENTMCNC: 29.1 G/DL
MCV RBC AUTO: 70.7 FL
PLATELET # BLD AUTO: 206 K/UL
PMV BLD: 9.7 FL
POTASSIUM SERPL-SCNC: 4.8 MMOL/L
PROT SERPL-MCNC: 6.4 G/DL
RBC # BLD: 3.89 M/UL
RBC # FLD: 17.2 %
SODIUM SERPL-SCNC: 144 MMOL/L
TIBC SERPL-MCNC: 285 UG/DL
UIBC SERPL-MCNC: 268 UG/DL
VIT B12 SERPL-MCNC: 666 PG/ML
WBC # FLD AUTO: 6.55 K/UL

## 2021-09-22 PROCEDURE — 99214 OFFICE O/P EST MOD 30 MIN: CPT

## 2021-09-22 NOTE — HISTORY OF PRESENT ILLNESS
[de-identified] : 8/28/2020\par Patient is here for a follow-up visit for HOMERO, accompanied by family member.  Most recent CBC shows slight improvement in microcytic anemia, hgb now at 9.2g/dL.  Patient denies fever, chills, nausea, vomiting, pica or bleeding.  They report his appetite has improved slightly.  Spoke with patient at length regarding recent CT imaging which shows suspicious 8mm RUL nodule, pancreatic lesions and urinary bladder thickening.  However, the patient's family member states he had UroLift procedure on 8/6, just prior to recent imaging so this may be related.  Since last visit, they have also been seen by GI, Dr. Tamez, for capsule endoscopy.  \par CT C/A/P (8.28.2020) IMPRESSION:No thoracic lymphadenopathy.  8 mm speculated solid nodule in the apical right upper lobe. Short-term follow-up in 3 months, PET/CT or tissue sampling is recommended. Main pulmonary artery is borderline enlarged measuring approximately 3.2 cm which can be seen with pulmonary hypertension.  3.2 x 3.2 cm hypoattenuating pancreatic tail lesion may represent a multicystic septated lesion.  Additional hypoattenuating/cystic lesions in the pancreatic head. Recommend evaluation with pancreatic protocol MRI. Diffusely markedly trabeculated thick-walled urinary bladder. Urologic follow-up recommended and correlation with direct visualization as warranted. Prostatomegaly.  No lymphadenopathy identified in the abdomen or pelvis.\par \par 9/18/2020\par Patient is here for a follow-up visit for HOMERO, accompanied by family member.  He is s/p Venofer 200mg x 5 doses as of 8/28/2020.  Patient denies fever, chills, nausea, vomiting, pica or bleeding.  Spoke with patient regarding recent MR Abd which shows multicystic lesions in pancreatic tail and recommends biopsy.  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 No mural nodularity or solid components identified. EUS/FNA or imaging follow-up may be considered as clinically warranted. Additional scattered cystic lesions in the pancreas.\par \par 10/30/2020\par Patient is here for a follow-up visit for HOMERO, accompanied by family member.  Last Venofer x 5 doses completed in 8/2020.  Patient denies fever, chills, nausea, vomiting, pica or bleeding.  Reviewed most recent labwork which shows microcytic anemia with evidence of iron deficiency.  Spoke with patient regarding recent MR Abd which shows multicystic lesions in pancreatic tail and recommends biopsy.  \par \par 11.23.20\par pet/ct\par No pathologic FDG uptake to suggest biologic tumor activity. Specifically the 8 mm right upper lobe nodule is not FDG avid. 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 Recommend further MR surveillance.\par \par \par 5/28/2021- pt here for follow up for HOMERO and pancreatic tail lesion/pulmonary nodule on imaging.  He is s/p venofer x 5 todays  cbc  HGB 8.3 HCT 27.5 iron 20 sat 7% Ferritin 15.   Patient feeling a lot of fatigue and weakness.  Has been seen by Dr. Tamez had endoscopy and colonoscopy march 2021 along with capsule endoscopy as well unable to locate source of bleeding.   [de-identified] : Patient is a 85 yo M with PMHx of DM presents here today for establishment of care. He was referred here for Anemia. Blood work reviewed and shows that patient had a H/H of 13/41.3 in September 2019. He had a repeat in November 2019 which was 9.2/28 with MCV 84.2. Iron studies showed Iron <10, Ferritin 10.3 with 2% saturation. Patient had a colonoscopy and EGD done in November of 2019. Patient was poorly prepped for Colonoscopy and had one polyp removed. EGD was unremarkable. Since then, patient has had persistently decreasing hemoglobin levels with last hemoglobin level of 7.2 with MCV 64 in July 2020. He reports extreme fatigue but otherwise denies any shortness of breath, chest pain, palpitations, melena, or hematochezia. \par \par Other blood work revealed NL to elevated Folic Acid, Vitamin B12 of 152, Homocysteine 14

## 2021-09-22 NOTE — ASSESSMENT
[FreeTextEntry1] : 1. 85 yo M with  Iron Deficiency Anemia and suspect that this is secondary likely to GI Bleed; noted that in 2019, patient's colonoscopy was "poorly prepped."  patient noted to have low Vitamin B12 on previous blood work. \par - s/p full GI w/u including capsule endoscopy as per pt; \par - followup with GI, Dr. Tamez, as indicated; needs a repeat colonoscopy. \par - s/p Venofer 200mg x 5 doses as of 8/28/2020, 2/12/21 + 4/23/21\par - w/u by GI Dr Tamez: EGD and colonoscopy  were not remarkable\par - patient continues  losing blood as assessed by persistently low iron stores after iron infusion: \par - will T+C on 6.1.2021 for transfusion for 2 units PRBC on 6.4.2021, consent obtained\par - will also order another venofer once weekly x 5 \par - recommend followup with URO for workup since GI workup unrevealing so far\par \par #Prostatomegaly with thick-walled urinary bladder noted on CT from 08/2020\par - Urologic follow-up recommended, Dr. 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  Noted on 08/2020\par - Short-term follow-up in 3 months, PET/CT or tissue sampling is recommended (~11/2020): will defer now until he resolves pancreas issue and HOMERO issue and bladder issue: PET/CT Nov 2020 did not note hypermetabolic mass; explained that carcinoid or BALC may not present w/ an increased uptake on pet ; pt wants to repeat imaging in a few months and deferred pulmonary or thoracic consult\par \par \par \par \par - agreed NOT TO REPEAT PET SCAN as per request of pt and his daughter; his grandson is an ICU ATTENDING\par - GI and  w/u not revealing ; iron again decreased and anemia HG 8G\par will give venofer again

## 2021-09-27 ENCOUNTER — APPOINTMENT (OUTPATIENT)
Dept: INFUSION THERAPY | Facility: CLINIC | Age: 86
End: 2021-09-27

## 2021-09-27 RX ORDER — IRON SUCROSE 20 MG/ML
200 INJECTION, SOLUTION INTRAVENOUS ONCE
Refills: 0 | Status: COMPLETED | OUTPATIENT
Start: 2021-09-27 | End: 2021-09-27

## 2021-09-27 RX ADMIN — IRON SUCROSE 220 MILLIGRAM(S): 20 INJECTION, SOLUTION INTRAVENOUS at 15:00

## 2021-10-04 ENCOUNTER — APPOINTMENT (OUTPATIENT)
Dept: INFUSION THERAPY | Facility: CLINIC | Age: 86
End: 2021-10-04

## 2021-10-04 RX ORDER — IRON SUCROSE 20 MG/ML
200 INJECTION, SOLUTION INTRAVENOUS ONCE
Refills: 0 | Status: COMPLETED | OUTPATIENT
Start: 2021-10-04 | End: 2021-10-04

## 2021-10-04 RX ADMIN — IRON SUCROSE 220 MILLIGRAM(S): 20 INJECTION, SOLUTION INTRAVENOUS at 14:44

## 2021-10-12 ENCOUNTER — APPOINTMENT (OUTPATIENT)
Dept: INFUSION THERAPY | Facility: CLINIC | Age: 86
End: 2021-10-12

## 2021-10-12 RX ORDER — IRON SUCROSE 20 MG/ML
200 INJECTION, SOLUTION INTRAVENOUS ONCE
Refills: 0 | Status: COMPLETED | OUTPATIENT
Start: 2021-10-12 | End: 2021-10-12

## 2021-10-12 RX ADMIN — IRON SUCROSE 220 MILLIGRAM(S): 20 INJECTION, SOLUTION INTRAVENOUS at 16:27

## 2021-10-18 ENCOUNTER — APPOINTMENT (OUTPATIENT)
Dept: INFUSION THERAPY | Facility: CLINIC | Age: 86
End: 2021-10-18

## 2021-10-18 ENCOUNTER — OUTPATIENT (OUTPATIENT)
Dept: OUTPATIENT SERVICES | Facility: HOSPITAL | Age: 86
LOS: 1 days | Discharge: HOME | End: 2021-10-18

## 2021-10-18 DIAGNOSIS — D50.9 IRON DEFICIENCY ANEMIA, UNSPECIFIED: ICD-10-CM

## 2021-10-18 RX ORDER — IRON SUCROSE 20 MG/ML
200 INJECTION, SOLUTION INTRAVENOUS ONCE
Refills: 0 | Status: COMPLETED | OUTPATIENT
Start: 2021-10-18 | End: 2021-10-18

## 2021-10-18 RX ADMIN — IRON SUCROSE 220 MILLIGRAM(S): 20 INJECTION, SOLUTION INTRAVENOUS at 15:04

## 2021-10-25 ENCOUNTER — APPOINTMENT (OUTPATIENT)
Dept: INFUSION THERAPY | Facility: CLINIC | Age: 86
End: 2021-10-25

## 2021-10-25 RX ORDER — IRON SUCROSE 20 MG/ML
200 INJECTION, SOLUTION INTRAVENOUS ONCE
Refills: 0 | Status: COMPLETED | OUTPATIENT
Start: 2021-10-25 | End: 2021-10-25

## 2021-10-25 RX ADMIN — IRON SUCROSE 220 MILLIGRAM(S): 20 INJECTION, SOLUTION INTRAVENOUS at 14:47

## 2021-11-26 ENCOUNTER — LABORATORY RESULT (OUTPATIENT)
Age: 86
End: 2021-11-26

## 2021-11-26 ENCOUNTER — APPOINTMENT (OUTPATIENT)
Dept: HEMATOLOGY ONCOLOGY | Facility: CLINIC | Age: 86
End: 2021-11-26

## 2021-11-26 LAB
HCT VFR BLD CALC: 29.1 %
HGB BLD-MCNC: 8.7 G/DL
MCHC RBC-ENTMCNC: 20.6 PG
MCHC RBC-ENTMCNC: 29.9 G/DL
MCV RBC AUTO: 68.8 FL
PLATELET # BLD AUTO: 223 K/UL
PMV BLD: 9.6 FL
RBC # BLD: 4.23 M/UL
RBC # FLD: 20.6 %
WBC # FLD AUTO: 7.24 K/UL

## 2021-11-27 LAB
FERRITIN SERPL-MCNC: 16 NG/ML
IRON SATN MFR SERPL: 6 %
IRON SERPL-MCNC: 15 UG/DL
TIBC SERPL-MCNC: 268 UG/DL
UIBC SERPL-MCNC: 253 UG/DL

## 2021-11-30 ENCOUNTER — APPOINTMENT (OUTPATIENT)
Dept: HEMATOLOGY ONCOLOGY | Facility: CLINIC | Age: 86
End: 2021-11-30

## 2021-12-10 ENCOUNTER — APPOINTMENT (OUTPATIENT)
Dept: HEMATOLOGY ONCOLOGY | Facility: CLINIC | Age: 86
End: 2021-12-10
Payer: MEDICARE

## 2021-12-10 ENCOUNTER — APPOINTMENT (OUTPATIENT)
Dept: INFUSION THERAPY | Facility: CLINIC | Age: 86
End: 2021-12-10
Payer: MEDICARE

## 2021-12-10 PROCEDURE — 99214 OFFICE O/P EST MOD 30 MIN: CPT

## 2021-12-10 RX ORDER — IRON SUCROSE 20 MG/ML
200 INJECTION, SOLUTION INTRAVENOUS ONCE
Refills: 0 | Status: COMPLETED | OUTPATIENT
Start: 2021-12-10 | End: 2021-12-10

## 2021-12-10 RX ADMIN — IRON SUCROSE 220 MILLIGRAM(S): 20 INJECTION, SOLUTION INTRAVENOUS at 10:10

## 2021-12-10 RX ADMIN — IRON SUCROSE 200 MILLIGRAM(S): 20 INJECTION, SOLUTION INTRAVENOUS at 10:40

## 2021-12-10 NOTE — HISTORY OF PRESENT ILLNESS
[de-identified] : Patient is a 85 yo M with PMHx of DM presents here today for establishment of care. He was referred here for Anemia. Blood work reviewed and shows that patient had a H/H of 13/41.3 in September 2019. He had a repeat in November 2019 which was 9.2/28 with MCV 84.2. Iron studies showed Iron <10, Ferritin 10.3 with 2% saturation. Patient had a colonoscopy and EGD done in November of 2019. Patient was poorly prepped for Colonoscopy and had one polyp removed. EGD was unremarkable. Since then, patient has had persistently decreasing hemoglobin levels with last hemoglobin level of 7.2 with MCV 64 in July 2020. He reports extreme fatigue but otherwise denies any shortness of breath, chest pain, palpitations, melena, or hematochezia. \par \par Other blood work revealed NL to elevated Folic Acid, Vitamin B12 of 152, Homocysteine 14   [de-identified] : 8/28/2020\par Patient is here for a follow-up visit for HOMERO, accompanied by family member.  Most recent CBC shows slight improvement in microcytic anemia, hgb now at 9.2g/dL.  Patient denies fever, chills, nausea, vomiting, pica or bleeding.  They report his appetite has improved slightly.  Spoke with patient at length regarding recent CT imaging which shows suspicious 8mm RUL nodule, pancreatic lesions and urinary bladder thickening.  However, the patient's family member states he had UroLift procedure on 8/6, just prior to recent imaging so this may be related.  Since last visit, they have also been seen by GI, Dr. Tamez, for capsule endoscopy.  \par CT C/A/P (8.28.2020) IMPRESSION:No thoracic lymphadenopathy.  8 mm speculated solid nodule in the apical right upper lobe. Short-term follow-up in 3 months, PET/CT or tissue sampling is recommended. Main pulmonary artery is borderline enlarged measuring approximately 3.2 cm which can be seen with pulmonary hypertension.  3.2 x 3.2 cm hypoattenuating pancreatic tail lesion may represent a multicystic septated lesion.  Additional hypoattenuating/cystic lesions in the pancreatic head. Recommend evaluation with pancreatic protocol MRI. Diffusely markedly trabeculated thick-walled urinary bladder. Urologic follow-up recommended and correlation with direct visualization as warranted. Prostatomegaly.  No lymphadenopathy identified in the abdomen or pelvis.\par \par 9/18/2020\par Patient is here for a follow-up visit for HOMERO, accompanied by family member.  He is s/p Venofer 200mg x 5 doses as of 8/28/2020.  Patient denies fever, chills, nausea, vomiting, pica or bleeding.  Spoke with patient regarding recent MR Abd which shows multicystic lesions in pancreatic tail and recommends biopsy.  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 No mural nodularity or solid components identified. EUS/FNA or imaging follow-up may be considered as clinically warranted. Additional scattered cystic lesions in the pancreas.\par \par 10/30/2020\par Patient is here for a follow-up visit for HOMERO, accompanied by family member.  Last Venofer x 5 doses completed in 8/2020.  Patient denies fever, chills, nausea, vomiting, pica or bleeding.  Reviewed most recent labwork which shows microcytic anemia with evidence of iron deficiency.  Spoke with patient regarding recent MR Abd which shows multicystic lesions in pancreatic tail and recommends biopsy.  \par \par 11.23.20\par pet/ct\par No pathologic FDG uptake to suggest biologic tumor activity. Specifically the 8 mm right upper lobe nodule is not FDG avid. 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 Recommend further MR surveillance.\par \par \par 5/28/2021- pt here for follow up for HOMERO and pancreatic tail lesion/pulmonary nodule on imaging.  He is s/p venofer x 5 todays  cbc  HGB 8.3 HCT 27.5 iron 20 sat 7% Ferritin 15.   Patient feeling a lot of fatigue and weakness.  Has been seen by Dr. Tamez had endoscopy and colonoscopy march 2021 along with capsule endoscopy as well unable to locate source of bleeding.

## 2021-12-10 NOTE — REVIEW OF SYSTEMS
[Fatigue] : fatigue [Incontinence] : incontinence [Muscle Weakness] : muscle weakness [Negative] : Gastrointestinal [Fever] : no fever [Chills] : no chills [Night Sweats] : no night sweats [FreeTextEntry8] : Pt has known enlarged prostate  Resident

## 2021-12-10 NOTE — ASSESSMENT
[FreeTextEntry1] : 1. 87 yo M with  Iron Deficiency Anemia and suspect that this is secondary likely to GI Bleed; noted that in 2019, patient's colonoscopy was "poorly prepped."  patient noted to have low Vitamin B12 on previous blood work. \par - s/p full GI w/u including capsule endoscopy as per pt; \par - followup with GI, Dr. Tamez, as indicated; needs a repeat colonoscopy. \par - s/p Venofer 200mg x 5 doses as of 8/28/2020, 2/12/21 + 4/23/21\par - w/u by GI Dr Tamez: EGD and colonoscopy  were not remarkable\par - patient continues  losing blood as assessed by persistently low iron stores after iron infusion: \par - will T+C on 6.1.2021 for transfusion for 2 units PRBC on 6.4.2021, consent obtained\par - will also order another venofer once weekly x 5 \par - recommend followup with URO for workup since GI workup unrevealing so far\par \par #Prostatomegaly with thick-walled urinary bladder noted on CT from 08/2020\par - Urologic follow-up recommended, Dr. 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  Noted on 08/2020\par - Short-term follow-up in 3 months, PET/CT or tissue sampling is recommended (~11/2020): will defer now until he resolves pancreas issue and HOMERO issue and bladder issue: PET/CT Nov 2020 did not note hypermetabolic mass; explained that carcinoid or BALC may not present w/ an increased uptake on pet ; pt wants to repeat imaging in a few months and deferred pulmonary or thoracic consult\par \par \par \par \par - agreed NOT TO REPEAT PET SCAN as per request of pt and his daughter; his grandson is an ICU ATTENDING\par - GI and  w/u not revealing ; iron again decreased and anemia HG 8G\par will give venofer again BEGINNING 12.10.21 and f/u after that

## 2021-12-17 ENCOUNTER — APPOINTMENT (OUTPATIENT)
Dept: INFUSION THERAPY | Facility: CLINIC | Age: 86
End: 2021-12-17

## 2021-12-17 RX ORDER — IRON SUCROSE 20 MG/ML
200 INJECTION, SOLUTION INTRAVENOUS ONCE
Refills: 0 | Status: COMPLETED | OUTPATIENT
Start: 2021-12-17 | End: 2021-12-17

## 2021-12-17 RX ADMIN — IRON SUCROSE 220 MILLIGRAM(S): 20 INJECTION, SOLUTION INTRAVENOUS at 14:56

## 2021-12-22 ENCOUNTER — APPOINTMENT (OUTPATIENT)
Dept: INFUSION THERAPY | Facility: CLINIC | Age: 86
End: 2021-12-22

## 2021-12-22 RX ORDER — IRON SUCROSE 20 MG/ML
200 INJECTION, SOLUTION INTRAVENOUS ONCE
Refills: 0 | Status: COMPLETED | OUTPATIENT
Start: 2021-12-22 | End: 2021-12-22

## 2021-12-22 RX ADMIN — IRON SUCROSE 220 MILLIGRAM(S): 20 INJECTION, SOLUTION INTRAVENOUS at 15:44

## 2021-12-22 RX ADMIN — IRON SUCROSE 200 MILLIGRAM(S): 20 INJECTION, SOLUTION INTRAVENOUS at 16:14

## 2021-12-30 ENCOUNTER — APPOINTMENT (OUTPATIENT)
Dept: INFUSION THERAPY | Facility: CLINIC | Age: 86
End: 2021-12-30

## 2021-12-30 RX ORDER — IRON SUCROSE 20 MG/ML
200 INJECTION, SOLUTION INTRAVENOUS ONCE
Refills: 0 | Status: COMPLETED | OUTPATIENT
Start: 2021-12-30 | End: 2021-12-30

## 2021-12-30 RX ADMIN — IRON SUCROSE 220 MILLIGRAM(S): 20 INJECTION, SOLUTION INTRAVENOUS at 14:21

## 2022-01-01 ENCOUNTER — APPOINTMENT (OUTPATIENT)
Dept: INFUSION THERAPY | Facility: CLINIC | Age: 87
End: 2022-01-01

## 2022-01-01 ENCOUNTER — APPOINTMENT (OUTPATIENT)
Dept: HEMATOLOGY ONCOLOGY | Facility: CLINIC | Age: 87
End: 2022-01-01

## 2022-01-01 ENCOUNTER — LABORATORY RESULT (OUTPATIENT)
Age: 87
End: 2022-01-01

## 2022-01-01 ENCOUNTER — OUTPATIENT (OUTPATIENT)
Dept: OUTPATIENT SERVICES | Facility: HOSPITAL | Age: 87
LOS: 1 days | Discharge: HOME | End: 2022-01-01

## 2022-01-01 ENCOUNTER — APPOINTMENT (OUTPATIENT)
Dept: HEMATOLOGY ONCOLOGY | Facility: CLINIC | Age: 87
End: 2022-01-01
Payer: MEDICARE

## 2022-01-01 VITALS
HEART RATE: 71 BPM | DIASTOLIC BLOOD PRESSURE: 56 MMHG | BODY MASS INDEX: 29.62 KG/M2 | HEIGHT: 69 IN | TEMPERATURE: 97.9 F | WEIGHT: 200 LBS | RESPIRATION RATE: 18 BRPM | SYSTOLIC BLOOD PRESSURE: 123 MMHG

## 2022-01-01 VITALS
SYSTOLIC BLOOD PRESSURE: 157 MMHG | TEMPERATURE: 98.7 F | RESPIRATION RATE: 16 BRPM | DIASTOLIC BLOOD PRESSURE: 70 MMHG | HEART RATE: 63 BPM

## 2022-01-01 VITALS
TEMPERATURE: 97.2 F | SYSTOLIC BLOOD PRESSURE: 138 MMHG | DIASTOLIC BLOOD PRESSURE: 66 MMHG | HEIGHT: 68 IN | OXYGEN SATURATION: 99 % | WEIGHT: 187 LBS | BODY MASS INDEX: 28.34 KG/M2 | HEART RATE: 69 BPM

## 2022-01-01 VITALS
SYSTOLIC BLOOD PRESSURE: 134 MMHG | TEMPERATURE: 97.3 F | DIASTOLIC BLOOD PRESSURE: 60 MMHG | HEART RATE: 75 BPM | HEIGHT: 69 IN | WEIGHT: 189 LBS | BODY MASS INDEX: 27.99 KG/M2

## 2022-01-01 VITALS
HEIGHT: 68 IN | OXYGEN SATURATION: 98 % | DIASTOLIC BLOOD PRESSURE: 80 MMHG | SYSTOLIC BLOOD PRESSURE: 165 MMHG | RESPIRATION RATE: 16 BRPM | HEART RATE: 64 BPM | TEMPERATURE: 97.3 F

## 2022-01-01 DIAGNOSIS — D50.9 IRON DEFICIENCY ANEMIA, UNSPECIFIED: ICD-10-CM

## 2022-01-01 DIAGNOSIS — K86.2 CYST OF PANCREAS: ICD-10-CM

## 2022-01-01 DIAGNOSIS — R91.8 OTHER NONSPECIFIC ABNORMAL FINDING OF LUNG FIELD: ICD-10-CM

## 2022-01-01 DIAGNOSIS — R91.1 SOLITARY PULMONARY NODULE: ICD-10-CM

## 2022-01-01 LAB
ALBUMIN SERPL ELPH-MCNC: 3.7 G/DL
ALBUMIN SERPL ELPH-MCNC: 3.7 G/DL
ALBUMIN SERPL ELPH-MCNC: 3.9 G/DL
ALBUMIN SERPL ELPH-MCNC: 3.9 G/DL
ALP BLD-CCNC: 50 U/L
ALP BLD-CCNC: 57 U/L
ALP BLD-CCNC: 64 U/L
ALP BLD-CCNC: 66 U/L
ALT SERPL-CCNC: 10 U/L
ALT SERPL-CCNC: 10 U/L
ALT SERPL-CCNC: 6 U/L
ALT SERPL-CCNC: 7 U/L
ANION GAP SERPL CALC-SCNC: 11 MMOL/L
ANION GAP SERPL CALC-SCNC: 11 MMOL/L
ANION GAP SERPL CALC-SCNC: 12 MMOL/L
AST SERPL-CCNC: 11 U/L
AST SERPL-CCNC: 11 U/L
AST SERPL-CCNC: 12 U/L
AST SERPL-CCNC: 13 U/L
BASOPHILS # BLD AUTO: 0.04 K/UL
BASOPHILS # BLD AUTO: 0.04 K/UL
BASOPHILS NFR BLD AUTO: 0.6 %
BASOPHILS NFR BLD AUTO: 0.6 %
BILIRUB DIRECT SERPL-MCNC: <0.2 MG/DL
BILIRUB INDIRECT SERPL-MCNC: NORMAL MG/DL
BILIRUB SERPL-MCNC: <0.2 MG/DL
BUN SERPL-MCNC: 22 MG/DL
BUN SERPL-MCNC: 27 MG/DL
BUN SERPL-MCNC: 28 MG/DL
CALCIUM SERPL-MCNC: 8.6 MG/DL
CALCIUM SERPL-MCNC: 8.6 MG/DL
CALCIUM SERPL-MCNC: 8.7 MG/DL
CHLORIDE SERPL-SCNC: 100 MMOL/L
CHLORIDE SERPL-SCNC: 103 MMOL/L
CHLORIDE SERPL-SCNC: 104 MMOL/L
CO2 SERPL-SCNC: 27 MMOL/L
CO2 SERPL-SCNC: 27 MMOL/L
CO2 SERPL-SCNC: 30 MMOL/L
CREAT SERPL-MCNC: 1 MG/DL
CREAT SERPL-MCNC: 1.2 MG/DL
CREAT SERPL-MCNC: 1.2 MG/DL
EGFR: 59 ML/MIN/1.73M2
EGFR: 60 ML/MIN/1.73M2
EGFR: 74 ML/MIN/1.73M2
EOSINOPHIL # BLD AUTO: 0.2 K/UL
EOSINOPHIL # BLD AUTO: 0.32 K/UL
EOSINOPHIL NFR BLD AUTO: 3 %
EOSINOPHIL NFR BLD AUTO: 4.6 %
FERRITIN SERPL-MCNC: 13 NG/ML
FERRITIN SERPL-MCNC: 20 NG/ML
FERRITIN SERPL-MCNC: 24 NG/ML
FERRITIN SERPL-MCNC: 33 NG/ML
FOLATE SERPL-MCNC: 16.2 NG/ML
FOLATE SERPL-MCNC: 16.7 NG/ML
FOLATE SERPL-MCNC: >20 NG/ML
GLUCOSE SERPL-MCNC: 89 MG/DL
GLUCOSE SERPL-MCNC: 93 MG/DL
GLUCOSE SERPL-MCNC: 96 MG/DL
HCT VFR BLD CALC: 27.7 %
HCT VFR BLD CALC: 28.2 %
HCT VFR BLD CALC: 28.9 %
HCT VFR BLD CALC: 28.9 %
HGB BLD-MCNC: 8.3 G/DL
HGB BLD-MCNC: 8.5 G/DL
HGB BLD-MCNC: 8.6 G/DL
HGB BLD-MCNC: 9 G/DL
IMM GRANULOCYTES NFR BLD AUTO: 0.3 %
IMM GRANULOCYTES NFR BLD AUTO: 0.3 %
IRON SATN MFR SERPL: 5 %
IRON SATN MFR SERPL: 7 %
IRON SATN MFR SERPL: 7 %
IRON SATN MFR SERPL: 9 %
IRON SERPL-MCNC: 16 UG/DL
IRON SERPL-MCNC: 16 UG/DL
IRON SERPL-MCNC: 20 UG/DL
IRON SERPL-MCNC: 20 UG/DL
LYMPHOCYTES # BLD AUTO: 1.48 K/UL
LYMPHOCYTES # BLD AUTO: 1.58 K/UL
LYMPHOCYTES NFR BLD AUTO: 21.1 %
LYMPHOCYTES NFR BLD AUTO: 23.6 %
MAN DIFF?: NORMAL
MAN DIFF?: NORMAL
MCHC RBC-ENTMCNC: 20.9 PG
MCHC RBC-ENTMCNC: 21.3 PG
MCHC RBC-ENTMCNC: 21.7 PG
MCHC RBC-ENTMCNC: 22.4 PG
MCHC RBC-ENTMCNC: 29.4 G/DL
MCHC RBC-ENTMCNC: 30 G/DL
MCHC RBC-ENTMCNC: 30.5 G/DL
MCHC RBC-ENTMCNC: 31.1 G/DL
MCV RBC AUTO: 71 FL
MCV RBC AUTO: 71 FL
MCV RBC AUTO: 71.2 FL
MCV RBC AUTO: 71.9 FL
METHYLMALONATE SERPL-SCNC: 112 NMOL/L
METHYLMALONATE SERPL-SCNC: 120 NMOL/L
METHYLMALONATE SERPL-SCNC: 139 NMOL/L
MONOCYTES # BLD AUTO: 0.74 K/UL
MONOCYTES # BLD AUTO: 0.79 K/UL
MONOCYTES NFR BLD AUTO: 10.6 %
MONOCYTES NFR BLD AUTO: 11.8 %
NEUTROPHILS # BLD AUTO: 4.07 K/UL
NEUTROPHILS # BLD AUTO: 4.41 K/UL
NEUTROPHILS NFR BLD AUTO: 60.7 %
NEUTROPHILS NFR BLD AUTO: 62.8 %
PLATELET # BLD AUTO: 201 K/UL
PLATELET # BLD AUTO: 217 K/UL
PLATELET # BLD AUTO: 241 K/UL
PLATELET # BLD AUTO: 259 K/UL
PMV BLD: 9.2 FL
PMV BLD: 9.2 FL
POTASSIUM SERPL-SCNC: 4.2 MMOL/L
POTASSIUM SERPL-SCNC: 4.3 MMOL/L
POTASSIUM SERPL-SCNC: 4.9 MMOL/L
PROT SERPL-MCNC: 5.9 G/DL
PROT SERPL-MCNC: 6 G/DL
PROT SERPL-MCNC: 6 G/DL
PROT SERPL-MCNC: 6.2 G/DL
RBC # BLD: 3.9 M/UL
RBC # BLD: 3.96 M/UL
RBC # BLD: 4.02 M/UL
RBC # BLD: 4.07 M/UL
RBC # FLD: 18.3 %
RBC # FLD: 18.4 %
RBC # FLD: 19.7 %
RBC # FLD: 19.8 %
SODIUM SERPL-SCNC: 141 MMOL/L
SODIUM SERPL-SCNC: 142 MMOL/L
SODIUM SERPL-SCNC: 142 MMOL/L
TIBC SERPL-MCNC: 235 UG/DL
TIBC SERPL-MCNC: 236 UG/DL
TIBC SERPL-MCNC: 274 UG/DL
TIBC SERPL-MCNC: 318 UG/DL
UIBC SERPL-MCNC: 215 UG/DL
UIBC SERPL-MCNC: 220 UG/DL
UIBC SERPL-MCNC: 254 UG/DL
UIBC SERPL-MCNC: 302 UG/DL
VIT B12 SERPL-MCNC: 762 PG/ML
VIT B12 SERPL-MCNC: 790 PG/ML
VIT B12 SERPL-MCNC: 809 PG/ML
WBC # FLD AUTO: 6.54 K/UL
WBC # FLD AUTO: 6.7 K/UL
WBC # FLD AUTO: 6.79 K/UL
WBC # FLD AUTO: 7.01 K/UL

## 2022-01-01 PROCEDURE — 99214 OFFICE O/P EST MOD 30 MIN: CPT

## 2022-01-01 RX ORDER — IRON SUCROSE 20 MG/ML
200 INJECTION, SOLUTION INTRAVENOUS ONCE
Refills: 0 | Status: COMPLETED | OUTPATIENT
Start: 2022-01-01 | End: 2022-01-01

## 2022-01-01 RX ADMIN — IRON SUCROSE 110 MILLIGRAM(S): 20 INJECTION, SOLUTION INTRAVENOUS at 14:15

## 2022-01-01 RX ADMIN — IRON SUCROSE 110 MILLIGRAM(S): 20 INJECTION, SOLUTION INTRAVENOUS at 15:01

## 2022-01-01 RX ADMIN — IRON SUCROSE 110 MILLIGRAM(S): 20 INJECTION, SOLUTION INTRAVENOUS at 11:46

## 2022-01-01 RX ADMIN — IRON SUCROSE 110 MILLIGRAM(S): 20 INJECTION, SOLUTION INTRAVENOUS at 15:36

## 2022-01-01 RX ADMIN — IRON SUCROSE 200 MILLIGRAM(S): 20 INJECTION, SOLUTION INTRAVENOUS at 16:15

## 2022-01-01 RX ADMIN — IRON SUCROSE 110 MILLIGRAM(S): 20 INJECTION, SOLUTION INTRAVENOUS at 11:33

## 2022-01-01 RX ADMIN — IRON SUCROSE 110 MILLIGRAM(S): 20 INJECTION, SOLUTION INTRAVENOUS at 15:18

## 2022-01-01 RX ADMIN — IRON SUCROSE 220 MILLIGRAM(S): 20 INJECTION, SOLUTION INTRAVENOUS at 15:53

## 2022-01-01 RX ADMIN — IRON SUCROSE 220 MILLIGRAM(S): 20 INJECTION, SOLUTION INTRAVENOUS at 15:45

## 2022-01-01 RX ADMIN — IRON SUCROSE 110 MILLIGRAM(S): 20 INJECTION, SOLUTION INTRAVENOUS at 14:50

## 2022-01-01 RX ADMIN — IRON SUCROSE 110 MILLIGRAM(S): 20 INJECTION, SOLUTION INTRAVENOUS at 13:16

## 2022-01-01 RX ADMIN — IRON SUCROSE 110 MILLIGRAM(S): 20 INJECTION, SOLUTION INTRAVENOUS at 14:53

## 2022-01-01 RX ADMIN — IRON SUCROSE 220 MILLIGRAM(S): 20 INJECTION, SOLUTION INTRAVENOUS at 15:44

## 2022-01-01 RX ADMIN — IRON SUCROSE 220 MILLIGRAM(S): 20 INJECTION, SOLUTION INTRAVENOUS at 16:16

## 2022-01-01 RX ADMIN — IRON SUCROSE 110 MILLIGRAM(S): 20 INJECTION, SOLUTION INTRAVENOUS at 14:51

## 2022-01-01 RX ADMIN — IRON SUCROSE 110 MILLIGRAM(S): 20 INJECTION, SOLUTION INTRAVENOUS at 16:02

## 2022-01-01 RX ADMIN — IRON SUCROSE 110 MILLIGRAM(S): 20 INJECTION, SOLUTION INTRAVENOUS at 14:42

## 2022-01-01 RX ADMIN — IRON SUCROSE 200 MILLIGRAM(S): 20 INJECTION, SOLUTION INTRAVENOUS at 14:45

## 2022-01-01 RX ADMIN — IRON SUCROSE 200 MILLIGRAM(S): 20 INJECTION, SOLUTION INTRAVENOUS at 13:48

## 2022-01-01 RX ADMIN — IRON SUCROSE 110 MILLIGRAM(S): 20 INJECTION, SOLUTION INTRAVENOUS at 15:40

## 2022-01-01 RX ADMIN — IRON SUCROSE 200 MILLIGRAM(S): 20 INJECTION, SOLUTION INTRAVENOUS at 15:30

## 2022-01-01 RX ADMIN — IRON SUCROSE 110 MILLIGRAM(S): 20 INJECTION, SOLUTION INTRAVENOUS at 14:17

## 2022-01-01 RX ADMIN — IRON SUCROSE 220 MILLIGRAM(S): 20 INJECTION, SOLUTION INTRAVENOUS at 15:40

## 2022-01-07 ENCOUNTER — APPOINTMENT (OUTPATIENT)
Dept: INFUSION THERAPY | Facility: CLINIC | Age: 87
End: 2022-01-07

## 2022-01-07 ENCOUNTER — OUTPATIENT (OUTPATIENT)
Dept: OUTPATIENT SERVICES | Facility: HOSPITAL | Age: 87
LOS: 1 days | Discharge: HOME | End: 2022-01-07

## 2022-01-07 DIAGNOSIS — D50.9 IRON DEFICIENCY ANEMIA, UNSPECIFIED: ICD-10-CM

## 2022-01-07 RX ORDER — IRON SUCROSE 20 MG/ML
200 INJECTION, SOLUTION INTRAVENOUS ONCE
Refills: 0 | Status: COMPLETED | OUTPATIENT
Start: 2022-01-07 | End: 2022-01-07

## 2022-01-07 RX ADMIN — IRON SUCROSE 220 MILLIGRAM(S): 20 INJECTION, SOLUTION INTRAVENOUS at 14:25

## 2022-01-10 ENCOUNTER — APPOINTMENT (OUTPATIENT)
Dept: INFUSION THERAPY | Facility: CLINIC | Age: 87
End: 2022-01-10

## 2022-01-10 ENCOUNTER — APPOINTMENT (OUTPATIENT)
Dept: HEMATOLOGY ONCOLOGY | Facility: CLINIC | Age: 87
End: 2022-01-10

## 2022-02-09 ENCOUNTER — APPOINTMENT (OUTPATIENT)
Dept: HEMATOLOGY ONCOLOGY | Facility: CLINIC | Age: 87
End: 2022-02-09

## 2022-02-09 ENCOUNTER — LABORATORY RESULT (OUTPATIENT)
Age: 87
End: 2022-02-09

## 2022-02-09 LAB
HCT VFR BLD CALC: 29.8 %
HGB BLD-MCNC: 9 G/DL
IRON SATN MFR SERPL: 11 %
IRON SERPL-MCNC: 29 UG/DL
MCHC RBC-ENTMCNC: 21.5 PG
MCHC RBC-ENTMCNC: 30.2 G/DL
MCV RBC AUTO: 71.3 FL
PLATELET # BLD AUTO: 217 K/UL
PMV BLD: 9.3 FL
RBC # BLD: 4.18 M/UL
RBC # FLD: 20.7 %
TIBC SERPL-MCNC: 270 UG/DL
UIBC SERPL-MCNC: 241 UG/DL
WBC # FLD AUTO: 8.33 K/UL

## 2022-02-10 LAB — FERRITIN SERPL-MCNC: 18 NG/ML

## 2022-02-11 ENCOUNTER — APPOINTMENT (OUTPATIENT)
Dept: HEMATOLOGY ONCOLOGY | Facility: CLINIC | Age: 87
End: 2022-02-11
Payer: MEDICARE

## 2022-02-11 VITALS
HEART RATE: 76 BPM | SYSTOLIC BLOOD PRESSURE: 141 MMHG | DIASTOLIC BLOOD PRESSURE: 61 MMHG | TEMPERATURE: 98.2 F | BODY MASS INDEX: 30.66 KG/M2 | HEIGHT: 69 IN | WEIGHT: 207 LBS

## 2022-02-11 DIAGNOSIS — R91.8 OTHER NONSPECIFIC ABNORMAL FINDING OF LUNG FIELD: ICD-10-CM

## 2022-02-11 PROCEDURE — 99214 OFFICE O/P EST MOD 30 MIN: CPT

## 2022-02-11 RX ORDER — IRON SUCROSE 20 MG/ML
200 INJECTION, SOLUTION INTRAVENOUS ONCE
Refills: 0 | Status: DISCONTINUED | OUTPATIENT
Start: 2022-02-11 | End: 2022-02-11

## 2022-02-11 NOTE — ASSESSMENT
[FreeTextEntry1] : #  Iron Deficiency Anemia ; suspect that this is secondary likely to GI Bleed; noted that in 2019, patient's colonoscopy was "poorly prepped."  patient noted to have low Vitamin B12 on previous blood work. \par - s/p full GI w/u including capsule endoscopy as per pt; \par - followup with GI, Dr. Tamez, as indicated; needs a repeat colonoscopy. \par - s/p Venofer 200mg x 5 doses as of 8/28/2020, 2/12/21, 4/23/21, 1/7/22 \par - w/u by GI Dr Tamez: EGD and colonoscopy  were not remarkable\par - patient continues losing blood as assessed by persistently low iron stores after iron infusion\par - s/p transfusion for 2 units PRBC on 6.4.2021\par - will order another venofer once weekly x 5 \par - GI and  w/u not revealing ; iron again decreased and Hgb 9g/dL\par \par # Prostatomegaly with thick-walled urinary bladder noted on CT from 08/2020\par - Urologic follow-up recommended, Dr. Margarito Pemberton to r/o bladder neoplasm, and correlation with direct visualization as warranted\par \par # 3.2 x 3.2 cm hypoattenuating pancreatic tail lesion; cystic lesions in the pancreatic head on CT from 08/2020\par - MR Abd from 09/2020 which shows multicystic pancreatic tail lesion measuring up to 3.0 cm\par - discussed indication for FNA to r/o malignancy : GI f/u: : PET scan did not note hypermetabolic mass\par pt wants to watch for now\par - PET/CT 11.23.2020 showed no pathologic FDG uptake to suggest biologic tumor activity\par \par # RUL spiculated nodule, suspicious.  Noted on 08/2020\par - Short-term follow-up in 3 months, PET/CT or tissue sampling is recommended (~11/2020): will defer now until he resolves pancreas issue and HOMERO issue and bladder issue: PET/CT Nov 2020 did not note hypermetabolic mass; explained that carcinoid or BALC may not present w/ an increased uptake on pet ; pt  deferred reimaging or pulmonary / thoracic consult\par - agreed NOT TO REPEAT PET SCAN as per request of pt and his daughter; his grandson is an ICU ATTENDING\par \par RTC 5 weeks post final venofer with CBC, iron studies, ferritin 1-2 days prior

## 2022-02-11 NOTE — HISTORY OF PRESENT ILLNESS
[de-identified] : Patient is a 83 yo M with PMHx of DM presents here today for establishment of care. He was referred here for Anemia. Blood work reviewed and shows that patient had a H/H of 13/41.3 in September 2019. He had a repeat in November 2019 which was 9.2/28 with MCV 84.2. Iron studies showed Iron <10, Ferritin 10.3 with 2% saturation. Patient had a colonoscopy and EGD done in November of 2019. Patient was poorly prepped for Colonoscopy and had one polyp removed. EGD was unremarkable. Since then, patient has had persistently decreasing hemoglobin levels with last hemoglobin level of 7.2 with MCV 64 in July 2020. He reports extreme fatigue but otherwise denies any shortness of breath, chest pain, palpitations, melena, or hematochezia. \par \par Other blood work revealed NL to elevated Folic Acid, Vitamin B12 of 152, Homocysteine 14   [de-identified] : 8/28/2020\par Patient is here for a follow-up visit for HOMERO, accompanied by family member.  Most recent CBC shows slight improvement in microcytic anemia, hgb now at 9.2g/dL.  Patient denies fever, chills, nausea, vomiting, pica or bleeding.  They report his appetite has improved slightly.  Spoke with patient at length regarding recent CT imaging which shows suspicious 8mm RUL nodule, pancreatic lesions and urinary bladder thickening.  However, the patient's family member states he had UroLift procedure on 8/6, just prior to recent imaging so this may be related.  Since last visit, they have also been seen by GI, Dr. Tamez, for capsule endoscopy.  \par CT C/A/P (8.28.2020) IMPRESSION:No thoracic lymphadenopathy.  8 mm speculated solid nodule in the apical right upper lobe. Short-term follow-up in 3 months, PET/CT or tissue sampling is recommended. Main pulmonary artery is borderline enlarged measuring approximately 3.2 cm which can be seen with pulmonary hypertension.  3.2 x 3.2 cm hypoattenuating pancreatic tail lesion may represent a multicystic septated lesion.  Additional hypoattenuating/cystic lesions in the pancreatic head. Recommend evaluation with pancreatic protocol MRI. Diffusely markedly trabeculated thick-walled urinary bladder. Urologic follow-up recommended and correlation with direct visualization as warranted. Prostatomegaly.  No lymphadenopathy identified in the abdomen or pelvis.\par \par 9/18/2020\par Patient is here for a follow-up visit for HOMERO, accompanied by family member.  He is s/p Venofer 200mg x 5 doses as of 8/28/2020.  Patient denies fever, chills, nausea, vomiting, pica or bleeding.  Spoke with patient regarding recent MR Abd which shows multicystic lesions in pancreatic tail and recommends biopsy.  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 No mural nodularity or solid components identified. EUS/FNA or imaging follow-up may be considered as clinically warranted. Additional scattered cystic lesions in the pancreas.\par \par 10/30/2020\par Patient is here for a follow-up visit for HOMERO, accompanied by family member.  Last Venofer x 5 doses completed in 8/2020.  Patient denies fever, chills, nausea, vomiting, pica or bleeding.  Reviewed most recent labwork which shows microcytic anemia with evidence of iron deficiency.  Spoke with patient regarding recent MR Abd which shows multicystic lesions in pancreatic tail and recommends biopsy.  \par \par 11.23.20\par pet/ct\par No pathologic FDG uptake to suggest biologic tumor activity. Specifically the 8 mm right upper lobe nodule is not FDG avid. 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 Recommend further MR surveillance.\par \par \par 5/28/2021- pt here for follow up for HOMERO and pancreatic tail lesion/pulmonary nodule on imaging.  He is s/p venofer x 5 todays  cbc  HGB 8.3 HCT 27.5 iron 20 sat 7% Ferritin 15.   Patient feeling a lot of fatigue and weakness.  Has been seen by Dr. Tamez had endoscopy and colonoscopy march 2021 along with capsule endoscopy as well unable to locate source of bleeding.  \par \par 2/11/22\par Patient is here for a follow-up visit for HOMERO, accompanied by family member.  He is s/p Venofer x 5 doses , completed in 1.7.2022.  Patient denies fever, chills, nausea, vomiting, pica or overt bleeding.  Reviewed most recent labwork which shows persistent microcytic anemia with evidence of iron deficiency again.

## 2022-02-11 NOTE — PHYSICAL EXAM
[Fully active, able to carry on all pre-disease performance without restriction] : Status 0 - Fully active, able to carry on all pre-disease performance without restriction [Normal] : normal appearance, no rash, nodules, vesicles, ulcers, erythema [de-identified] : ambulating with assistance of single-point cane

## 2022-02-11 NOTE — REVIEW OF SYSTEMS
[Fatigue] : fatigue [Incontinence] : incontinence [Muscle Weakness] : muscle weakness [Negative] : Gastrointestinal [Recent Change In Weight] : ~T recent weight change [Fever] : no fever [Chills] : no chills [Night Sweats] : no night sweats [FreeTextEntry2] : gained 17lbs over last 6 months [FreeTextEntry8] : Pt has known enlarged prostate

## 2022-02-18 ENCOUNTER — APPOINTMENT (OUTPATIENT)
Dept: INFUSION THERAPY | Facility: CLINIC | Age: 87
End: 2022-02-18

## 2022-02-18 RX ORDER — IRON SUCROSE 20 MG/ML
200 INJECTION, SOLUTION INTRAVENOUS ONCE
Refills: 0 | Status: COMPLETED | OUTPATIENT
Start: 2022-02-18 | End: 2022-02-18

## 2022-02-18 RX ADMIN — IRON SUCROSE 110 MILLIGRAM(S): 20 INJECTION, SOLUTION INTRAVENOUS at 14:55

## 2022-02-25 ENCOUNTER — APPOINTMENT (OUTPATIENT)
Dept: INFUSION THERAPY | Facility: CLINIC | Age: 87
End: 2022-02-25

## 2022-02-25 RX ORDER — IRON SUCROSE 20 MG/ML
200 INJECTION, SOLUTION INTRAVENOUS ONCE
Refills: 0 | Status: COMPLETED | OUTPATIENT
Start: 2022-02-25 | End: 2022-02-25

## 2022-02-25 RX ADMIN — IRON SUCROSE 110 MILLIGRAM(S): 20 INJECTION, SOLUTION INTRAVENOUS at 15:30

## 2022-03-04 ENCOUNTER — APPOINTMENT (OUTPATIENT)
Dept: INFUSION THERAPY | Facility: CLINIC | Age: 87
End: 2022-03-04

## 2022-03-04 RX ORDER — IRON SUCROSE 20 MG/ML
200 INJECTION, SOLUTION INTRAVENOUS ONCE
Refills: 0 | Status: COMPLETED | OUTPATIENT
Start: 2022-03-04 | End: 2022-03-04

## 2022-03-04 RX ADMIN — IRON SUCROSE 110 MILLIGRAM(S): 20 INJECTION, SOLUTION INTRAVENOUS at 15:15

## 2022-05-04 NOTE — PHYSICAL EXAM
[Fully active, able to carry on all pre-disease performance without restriction] : Status 0 - Fully active, able to carry on all pre-disease performance without restriction [Normal] : normal appearance, no rash, nodules, vesicles, ulcers, erythema [de-identified] : ambulating with assistance of single-point cane

## 2022-05-04 NOTE — REVIEW OF SYSTEMS
[Fatigue] : fatigue [Recent Change In Weight] : ~T recent weight change [Incontinence] : incontinence [Muscle Weakness] : muscle weakness [Negative] : Gastrointestinal [Fever] : no fever [Chills] : no chills [Night Sweats] : no night sweats [FreeTextEntry2] : gained 17lbs over last 6 months [FreeTextEntry8] : Pt has known enlarged prostate

## 2022-05-04 NOTE — HISTORY OF PRESENT ILLNESS
[de-identified] : 8/28/2020\par Patient is here for a follow-up visit for HOMERO, accompanied by family member.  Most recent CBC shows slight improvement in microcytic anemia, hgb now at 9.2g/dL.  Patient denies fever, chills, nausea, vomiting, pica or bleeding.  They report his appetite has improved slightly.  Spoke with patient at length regarding recent CT imaging which shows suspicious 8mm RUL nodule, pancreatic lesions and urinary bladder thickening.  However, the patient's family member states he had UroLift procedure on 8/6, just prior to recent imaging so this may be related.  Since last visit, they have also been seen by GI, Dr. Tamez, for capsule endoscopy.  \par CT C/A/P (8.28.2020) IMPRESSION:No thoracic lymphadenopathy.  8 mm speculated solid nodule in the apical right upper lobe. Short-term follow-up in 3 months, PET/CT or tissue sampling is recommended. Main pulmonary artery is borderline enlarged measuring approximately 3.2 cm which can be seen with pulmonary hypertension.  3.2 x 3.2 cm hypoattenuating pancreatic tail lesion may represent a multicystic septated lesion.  Additional hypoattenuating/cystic lesions in the pancreatic head. Recommend evaluation with pancreatic protocol MRI. Diffusely markedly trabeculated thick-walled urinary bladder. Urologic follow-up recommended and correlation with direct visualization as warranted. Prostatomegaly.  No lymphadenopathy identified in the abdomen or pelvis.\par \par 9/18/2020\par Patient is here for a follow-up visit for HOMERO, accompanied by family member.  He is s/p Venofer 200mg x 5 doses as of 8/28/2020.  Patient denies fever, chills, nausea, vomiting, pica or bleeding.  Spoke with patient regarding recent MR Abd which shows multicystic lesions in pancreatic tail and recommends biopsy.  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 No mural nodularity or solid components identified. EUS/FNA or imaging follow-up may be considered as clinically warranted. Additional scattered cystic lesions in the pancreas.\par \par 10/30/2020\par Patient is here for a follow-up visit for HOMERO, accompanied by family member.  Last Venofer x 5 doses completed in 8/2020.  Patient denies fever, chills, nausea, vomiting, pica or bleeding.  Reviewed most recent labwork which shows microcytic anemia with evidence of iron deficiency.  Spoke with patient regarding recent MR Abd which shows multicystic lesions in pancreatic tail and recommends biopsy.  \par \par 11.23.20\par pet/ct\par No pathologic FDG uptake to suggest biologic tumor activity. Specifically the 8 mm right upper lobe nodule is not FDG avid. 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 Recommend further MR surveillance.\par \par \par 5/28/2021- pt here for follow up for HOMERO and pancreatic tail lesion/pulmonary nodule on imaging.  He is s/p venofer x 5 todays  cbc  HGB 8.3 HCT 27.5 iron 20 sat 7% Ferritin 15.   Patient feeling a lot of fatigue and weakness.  Has been seen by Dr. Tamez had endoscopy and colonoscopy march 2021 along with capsule endoscopy as well unable to locate source of bleeding.  \par \par 2/11/22\par Patient is here for a follow-up visit for HOMERO, accompanied by family member.  He is s/p Venofer x 5 doses , completed in 1.7.2022.  Patient denies fever, chills, nausea, vomiting, pica or overt bleeding.  Reviewed most recent labwork which shows persistent microcytic anemia with evidence of iron deficiency again.   [de-identified] : Patient is a 85 yo M with PMHx of DM presents here today for establishment of care. He was referred here for Anemia. Blood work reviewed and shows that patient had a H/H of 13/41.3 in September 2019. He had a repeat in November 2019 which was 9.2/28 with MCV 84.2. Iron studies showed Iron <10, Ferritin 10.3 with 2% saturation. Patient had a colonoscopy and EGD done in November of 2019. Patient was poorly prepped for Colonoscopy and had one polyp removed. EGD was unremarkable. Since then, patient has had persistently decreasing hemoglobin levels with last hemoglobin level of 7.2 with MCV 64 in July 2020. He reports extreme fatigue but otherwise denies any shortness of breath, chest pain, palpitations, melena, or hematochezia. \par \par Other blood work revealed NL to elevated Folic Acid, Vitamin B12 of 152, Homocysteine 14

## 2022-05-04 NOTE — ASSESSMENT
[FreeTextEntry1] : #  Iron Deficiency Anemia ; suspect that this is secondary likely to GI Bleed; noted that in 2019, patient's colonoscopy was "poorly prepped."  patient noted to have low Vitamin B12 on previous blood work. \par - s/p full GI w/u including capsule endoscopy as per pt; \par - followup with GI, Dr. Tamez, as indicated; needs a repeat colonoscopy. \par - s/p Venofer 200mg x 5 doses as of 8/28/2020, 2/12/21, 4/23/21, 1/7/22 \par - w/u by GI Dr Tamez: EGD and colonoscopy  were not remarkable\par - patient continues losing blood as assessed by persistently low iron stores after iron infusion\par - s/p transfusion for 2 units PRBC on 6.4.2021\par - will order another venofer once weekly x 5 : will repeat again\par - GI and  w/u not revealing ; iron again decreased and Hgb 9g/dL\par \par # Prostatomegaly with thick-walled urinary bladder noted on CT from 08/2020\par - Urologic follow-up recommended, Dr. Margarito Pemberton to r/o bladder neoplasm, and correlation with direct visualization as warranted\par \par # 3.2 x 3.2 cm hypoattenuating pancreatic tail lesion; cystic lesions in the pancreatic head on CT from 08/2020\par - MR Abd from 09/2020 which shows multicystic pancreatic tail lesion measuring up to 3.0 cm\par - discussed indication for FNA to r/o malignancy : GI f/u: : PET scan did not note hypermetabolic mass\par pt wants to watch for now\par - PET/CT 11.23.2020 showed no pathologic FDG uptake to suggest biologic tumor activity\par \par # RUL spiculated nodule, suspicious.  Noted on 08/2020\par - Short-term follow-up in 3 months, PET/CT or tissue sampling is recommended (~11/2020): will defer now until he resolves pancreas issue and HOMERO issue and bladder issue: PET/CT Nov 2020 did not note hypermetabolic mass; explained that carcinoid or BALC may not present w/ an increased uptake on pet ; pt  deferred reimaging or pulmonary / thoracic consult\par - agreed NOT TO REPEAT PET SCAN as per request of pt and his daughter; his grandson is an ICU ATTENDING\par \par RTC 5 weeks post final venofer with CBC, iron studies, ferritin 1-2 days prior

## 2022-07-20 NOTE — PHYSICAL EXAM
[Fully active, able to carry on all pre-disease performance without restriction] : Status 0 - Fully active, able to carry on all pre-disease performance without restriction [Normal] : normal appearance, no rash, nodules, vesicles, ulcers, erythema [de-identified] : ambulating with assistance of single-point cane

## 2022-07-20 NOTE — HISTORY OF PRESENT ILLNESS
[de-identified] : Patient is a 83 yo M with PMHx of DM presents here today for establishment of care. He was referred here for Anemia. Blood work reviewed and shows that patient had a H/H of 13/41.3 in September 2019. He had a repeat in November 2019 which was 9.2/28 with MCV 84.2. Iron studies showed Iron <10, Ferritin 10.3 with 2% saturation. Patient had a colonoscopy and EGD done in November of 2019. Patient was poorly prepped for Colonoscopy and had one polyp removed. EGD was unremarkable. Since then, patient has had persistently decreasing hemoglobin levels with last hemoglobin level of 7.2 with MCV 64 in July 2020. He reports extreme fatigue but otherwise denies any shortness of breath, chest pain, palpitations, melena, or hematochezia. \par \par Other blood work revealed NL to elevated Folic Acid, Vitamin B12 of 152, Homocysteine 14   [de-identified] : 8/28/2020\par Patient is here for a follow-up visit for HOMERO, accompanied by family member.  Most recent CBC shows slight improvement in microcytic anemia, hgb now at 9.2g/dL.  Patient denies fever, chills, nausea, vomiting, pica or bleeding.  They report his appetite has improved slightly.  Spoke with patient at length regarding recent CT imaging which shows suspicious 8mm RUL nodule, pancreatic lesions and urinary bladder thickening.  However, the patient's family member states he had UroLift procedure on 8/6, just prior to recent imaging so this may be related.  Since last visit, they have also been seen by GI, Dr. Tamez, for capsule endoscopy.  \par CT C/A/P (8.28.2020) IMPRESSION:No thoracic lymphadenopathy.  8 mm speculated solid nodule in the apical right upper lobe. Short-term follow-up in 3 months, PET/CT or tissue sampling is recommended. Main pulmonary artery is borderline enlarged measuring approximately 3.2 cm which can be seen with pulmonary hypertension.  3.2 x 3.2 cm hypoattenuating pancreatic tail lesion may represent a multicystic septated lesion.  Additional hypoattenuating/cystic lesions in the pancreatic head. Recommend evaluation with pancreatic protocol MRI. Diffusely markedly trabeculated thick-walled urinary bladder. Urologic follow-up recommended and correlation with direct visualization as warranted. Prostatomegaly.  No lymphadenopathy identified in the abdomen or pelvis.\par \par 9/18/2020\par Patient is here for a follow-up visit for HOMERO, accompanied by family member.  He is s/p Venofer 200mg x 5 doses as of 8/28/2020.  Patient denies fever, chills, nausea, vomiting, pica or bleeding.  Spoke with patient regarding recent MR Abd which shows multicystic lesions in pancreatic tail and recommends biopsy.  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 No mural nodularity or solid components identified. EUS/FNA or imaging follow-up may be considered as clinically warranted. Additional scattered cystic lesions in the pancreas.\par \par 10/30/2020\par Patient is here for a follow-up visit for HOMERO, accompanied by family member.  Last Venofer x 5 doses completed in 8/2020.  Patient denies fever, chills, nausea, vomiting, pica or bleeding.  Reviewed most recent labwork which shows microcytic anemia with evidence of iron deficiency.  Spoke with patient regarding recent MR Abd which shows multicystic lesions in pancreatic tail and recommends biopsy.  \par \par 11.23.20\par pet/ct\par No pathologic FDG uptake to suggest biologic tumor activity. Specifically the 8 mm right upper lobe nodule is not FDG avid. 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 Recommend further MR surveillance.\par \par \par 5/28/2021- pt here for follow up for HOMERO and pancreatic tail lesion/pulmonary nodule on imaging.  He is s/p venofer x 5 todays  cbc  HGB 8.3 HCT 27.5 iron 20 sat 7% Ferritin 15.   Patient feeling a lot of fatigue and weakness.  Has been seen by Dr. Tamez had endoscopy and colonoscopy march 2021 along with capsule endoscopy as well unable to locate source of bleeding.  \par \par 2/11/22\par Patient is here for a follow-up visit for HOMERO, accompanied by family member.  He is s/p Venofer x 5 doses , completed in 1.7.2022.  Patient denies fever, chills, nausea, vomiting, pica or overt bleeding.  Reviewed most recent labwork which shows persistent microcytic anemia with evidence of iron deficiency again.

## 2022-09-30 NOTE — REASON FOR VISIT
[Follow-Up Visit] : a follow-up visit for [Other: _____] : [unfilled] [FreeTextEntry2] : HOMERO [TWNoteComboBox1] : [place]

## 2022-09-30 NOTE — ASSESSMENT
[FreeTextEntry1] : #  Iron Deficiency Anemia ; suspect that this is secondary likely to GI Bleed; noted that in 2019, patient's colonoscopy was "poorly prepped."  patient noted to have low Vitamin B12 on previous blood work. \par - w/u by GI Dr Tamez: EGD and colonoscopy  were not remarkable ; s/p full GI w/u including capsule endoscopy \par - patient continues losing blood as assessed by persistently low iron stores after iron infusion\par - s/p transfusion for 2 units PRBC on 6.4.2021\par - s/p Venofer 200mg x 5 doses as of 8/28/2020, 2/12/21, 4/23/21, 1/7/22, 8/26/22\par - GI and  w/u not revealing ; iron again decreased and Hgb 9g/dL\par - will order another venofer once weekly x 5\par \par # Prostatomegaly with thick-walled urinary bladder noted on CT from 08/2020\par - Urologic follow-up recommended, Dr. Margarito Pemberton to r/o bladder neoplasm, and correlation with direct visualization as warranted \par \par # 3.2 x 3.2 cm hypoattenuating pancreatic tail lesion; cystic lesions in the pancreatic head on CT from 08/2020\par - MR Abd from 09/2020 which shows multicystic pancreatic tail lesion measuring up to 3.0 cm\par - discussed indication for FNA to r/o malignancy : GI f/u: : PET scan did not note hypermetabolic mass\par pt wants to watch for now\par - PET/CT 11.23.2020 showed no pathologic FDG uptake to suggest biologic tumor activity\par \par # RUL spiculated nodule, suspicious.  Noted on 08/2020\par - Short-term follow-up in 3 months, PET/CT or tissue sampling was recommended (~11/2020)\par - PET/CT Nov 2020 did not note hypermetabolic mass; explained that carcinoid or BALC may not present w/ an increased uptake on pet \par - pt  deferred reimaging as he follows with pulmonary / thoracic \par - agreed NOT TO REPEAT PET SCAN as per request of pt and his daughter; his grandson is an ICU ATTENDING\par \par Strongly recommend CT Head no contrast + XR imaging due to recent fall (no LoC) but patient and daughter again defer as he did not hit his head, is not symptomatic and they would like to speak with PCP.  \par \par RTC 5 weeks post final venofer with CBC, iron studies, ferritin 1-2 days prior\par seen/examined w/ NP Lora ;note reviewed; case discussed\par agreed that we will not do w/u of pancreas,bladder and lung lesions; pt/his daughter and his son (ICU atttending ) understand and agree; he has unexplained severe HOMERO; s/p iron IV and will continue as it is still severe

## 2022-09-30 NOTE — REVIEW OF SYSTEMS
[Fatigue] : fatigue [Recent Change In Weight] : ~T recent weight change [Incontinence] : incontinence [Muscle Weakness] : muscle weakness [Negative] : Gastrointestinal [Fever] : no fever [Chills] : no chills [Night Sweats] : no night sweats [Abdominal Pain] : no abdominal pain [FreeTextEntry8] : Pt has known enlarged prostate

## 2022-09-30 NOTE — HISTORY OF PRESENT ILLNESS
[de-identified] : Patient is a 83 yo M with PMHx of DM presents here today for establishment of care. He was referred here for Anemia. Blood work reviewed and shows that patient had a H/H of 13/41.3 in September 2019. He had a repeat in November 2019 which was 9.2/28 with MCV 84.2. Iron studies showed Iron <10, Ferritin 10.3 with 2% saturation. Patient had a colonoscopy and EGD done in November of 2019. Patient was poorly prepped for Colonoscopy and had one polyp removed. EGD was unremarkable. Since then, patient has had persistently decreasing hemoglobin levels with last hemoglobin level of 7.2 with MCV 64 in July 2020. He reports extreme fatigue but otherwise denies any shortness of breath, chest pain, palpitations, melena, or hematochezia. \par \par Other blood work revealed NL to elevated Folic Acid, Vitamin B12 of 152, Homocysteine 14   [de-identified] : 8/28/2020\par Patient is here for a follow-up visit for HOMERO, accompanied by family member.  Most recent CBC shows slight improvement in microcytic anemia, hgb now at 9.2g/dL.  Patient denies fever, chills, nausea, vomiting, pica or bleeding.  They report his appetite has improved slightly.  Spoke with patient at length regarding recent CT imaging which shows suspicious 8mm RUL nodule, pancreatic lesions and urinary bladder thickening.  However, the patient's family member states he had UroLift procedure on 8/6, just prior to recent imaging so this may be related.  Since last visit, they have also been seen by GI, Dr. Tamez, for capsule endoscopy.  \par CT C/A/P (8.28.2020) IMPRESSION:No thoracic lymphadenopathy.  8 mm speculated solid nodule in the apical right upper lobe. Short-term follow-up in 3 months, PET/CT or tissue sampling is recommended. Main pulmonary artery is borderline enlarged measuring approximately 3.2 cm which can be seen with pulmonary hypertension.  3.2 x 3.2 cm hypoattenuating pancreatic tail lesion may represent a multicystic septated lesion.  Additional hypoattenuating/cystic lesions in the pancreatic head. Recommend evaluation with pancreatic protocol MRI. Diffusely markedly trabeculated thick-walled urinary bladder. Urologic follow-up recommended and correlation with direct visualization as warranted. Prostatomegaly.  No lymphadenopathy identified in the abdomen or pelvis.\par \par 9/18/2020\par Patient is here for a follow-up visit for HOMERO, accompanied by family member.  He is s/p Venofer 200mg x 5 doses as of 8/28/2020.  Patient denies fever, chills, nausea, vomiting, pica or bleeding.  Spoke with patient regarding recent MR Abd which shows multicystic lesions in pancreatic tail and recommends biopsy.  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 No mural nodularity or solid components identified. EUS/FNA or imaging follow-up may be considered as clinically warranted. Additional scattered cystic lesions in the pancreas.\par \par 10/30/2020\par Patient is here for a follow-up visit for HOMERO, accompanied by family member.  Last Venofer x 5 doses completed in 8/2020.  Patient denies fever, chills, nausea, vomiting, pica or bleeding.  Reviewed most recent labwork which shows microcytic anemia with evidence of iron deficiency.  Spoke with patient regarding recent MR Abd which shows multicystic lesions in pancreatic tail and recommends biopsy.  \par \par 11.23.20\par pet/ct\par No pathologic FDG uptake to suggest biologic tumor activity. Specifically the 8 mm right upper lobe nodule is not FDG avid. 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 Recommend further MR surveillance.\par \par \par 5/28/2021- pt here for follow up for HOMERO and pancreatic tail lesion/pulmonary nodule on imaging.  He is s/p venofer x 5 todays  cbc  HGB 8.3 HCT 27.5 iron 20 sat 7% Ferritin 15.   Patient feeling a lot of fatigue and weakness.  Has been seen by Dr. Tamez had endoscopy and colonoscopy march 2021 along with capsule endoscopy as well unable to locate source of bleeding.  \par \par 2/11/22\par Patient is here for a follow-up visit for HOMERO, accompanied by family member.  He is s/p Venofer x 5 doses , completed in 1.7.2022.  Patient denies fever, chills, nausea, vomiting, pica or overt bleeding.  Reviewed most recent labwork which shows persistent microcytic anemia with evidence of iron deficiency again.  \par \par 9/30/22\par Patient is here for a follow-up visit for HOMERO, accompanied by family member.  He is s/p Venofer x 5 doses , completed on 8.26.2022.  Patient denies fever, chills, nausea, vomiting, abdominal pain or overt bleeding.  Reviewed most recent labwork which shows persistent microcytic anemia, hgb 9g/dL with borderline low iron levels again.  He suffered 2 falls recently due to balance issue (no loss of consciousness) and is recovering.

## 2022-09-30 NOTE — PHYSICAL EXAM
[Fully active, able to carry on all pre-disease performance without restriction] : Status 0 - Fully active, able to carry on all pre-disease performance without restriction [Normal] : normal appearance, no rash, nodules, vesicles, ulcers, erythema [de-identified] : L eyelid closed [de-identified] : ambulating with assistance of single-point cane

## 2022-12-16 PROBLEM — R91.1 LUNG NODULE: Status: ACTIVE | Noted: 2022-01-01

## 2022-12-16 PROBLEM — D50.9 IDA (IRON DEFICIENCY ANEMIA): Status: ACTIVE | Noted: 2020-08-04

## 2022-12-16 PROBLEM — K86.2 PANCREATIC CYST: Status: ACTIVE | Noted: 2022-01-01

## 2022-12-16 NOTE — HISTORY OF PRESENT ILLNESS
[de-identified] : Patient is a 85 yo M with PMHx of DM presents here today for establishment of care. He was referred here for Anemia. Blood work reviewed and shows that patient had a H/H of 13/41.3 in September 2019. He had a repeat in November 2019 which was 9.2/28 with MCV 84.2. Iron studies showed Iron <10, Ferritin 10.3 with 2% saturation. Patient had a colonoscopy and EGD done in November of 2019. Patient was poorly prepped for Colonoscopy and had one polyp removed. EGD was unremarkable. Since then, patient has had persistently decreasing hemoglobin levels with last hemoglobin level of 7.2 with MCV 64 in July 2020. He reports extreme fatigue but otherwise denies any shortness of breath, chest pain, palpitations, melena, or hematochezia. \par \par Other blood work revealed NL to elevated Folic Acid, Vitamin B12 of 152, Homocysteine 14   [de-identified] : 8/28/2020\par Patient is here for a follow-up visit for HOMERO, accompanied by family member.  Most recent CBC shows slight improvement in microcytic anemia, hgb now at 9.2g/dL.  Patient denies fever, chills, nausea, vomiting, pica or bleeding.  They report his appetite has improved slightly.  Spoke with patient at length regarding recent CT imaging which shows suspicious 8mm RUL nodule, pancreatic lesions and urinary bladder thickening.  However, the patient's family member states he had UroLift procedure on 8/6, just prior to recent imaging so this may be related.  Since last visit, they have also been seen by GI, Dr. Tamez, for capsule endoscopy.  \par CT C/A/P (8.28.2020) IMPRESSION:No thoracic lymphadenopathy.  8 mm speculated solid nodule in the apical right upper lobe. Short-term follow-up in 3 months, PET/CT or tissue sampling is recommended. Main pulmonary artery is borderline enlarged measuring approximately 3.2 cm which can be seen with pulmonary hypertension.  3.2 x 3.2 cm hypoattenuating pancreatic tail lesion may represent a multicystic septated lesion.  Additional hypoattenuating/cystic lesions in the pancreatic head. Recommend evaluation with pancreatic protocol MRI. Diffusely markedly trabeculated thick-walled urinary bladder. Urologic follow-up recommended and correlation with direct visualization as warranted. Prostatomegaly.  No lymphadenopathy identified in the abdomen or pelvis.\par \par 9/18/2020\par Patient is here for a follow-up visit for HOMERO, accompanied by family member.  He is s/p Venofer 200mg x 5 doses as of 8/28/2020.  Patient denies fever, chills, nausea, vomiting, pica or bleeding.  Spoke with patient regarding recent MR Abd which shows multicystic lesions in pancreatic tail and recommends biopsy.  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 No mural nodularity or solid components identified. EUS/FNA or imaging follow-up may be considered as clinically warranted. Additional scattered cystic lesions in the pancreas.\par \par 10/30/2020\par Patient is here for a follow-up visit for HOMERO, accompanied by family member.  Last Venofer x 5 doses completed in 8/2020.  Patient denies fever, chills, nausea, vomiting, pica or bleeding.  Reviewed most recent labwork which shows microcytic anemia with evidence of iron deficiency.  Spoke with patient regarding recent MR Abd which shows multicystic lesions in pancreatic tail and recommends biopsy.  \par \par 11.23.20\par pet/ct\par No pathologic FDG uptake to suggest biologic tumor activity. Specifically the 8 mm right upper lobe nodule is not FDG avid. 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 Recommend further MR surveillance.\par \par \par 5/28/2021- pt here for follow up for HOMERO and pancreatic tail lesion/pulmonary nodule on imaging.  He is s/p venofer x 5 todays  cbc  HGB 8.3 HCT 27.5 iron 20 sat 7% Ferritin 15.   Patient feeling a lot of fatigue and weakness.  Has been seen by Dr. Tamez had endoscopy and colonoscopy march 2021 along with capsule endoscopy as well unable to locate source of bleeding.  \par \par 2/11/22\par Patient is here for a follow-up visit for HOMERO, accompanied by family member.  He is s/p Venofer x 5 doses , completed in 1.7.2022.  Patient denies fever, chills, nausea, vomiting, pica or overt bleeding.  Reviewed most recent labwork which shows persistent microcytic anemia with evidence of iron deficiency again.  \par \par 9/30/22\par Patient is here for a follow-up visit for HOMERO, accompanied by family member.  He is s/p Venofer x 5 doses , completed on 8.26.2022.  Patient denies fever, chills, nausea, vomiting, abdominal pain or overt bleeding.  Reviewed most recent labwork which shows persistent microcytic anemia, hgb 9g/dL with borderline low iron levels again.  He suffered 2 falls recently due to balance issue (no loss of consciousness) and is recovering.  \par \par 12/16/22\par Patient is here for a follow-up visit for HOMERO, accompanied by family member.  He is s/p Venofer x 5 doses , completed on 11.11.2022.  Patient denies fever, chills, nausea, vomiting, abdominal pain or overt bleeding.  Reviewed most recent labwork which shows persistent microcytic anemia, hgb 8.5g/dL with ferritin 24, ironsat 7, TIBC 274.

## 2022-12-16 NOTE — ASSESSMENT
[FreeTextEntry1] : #  Iron Deficiency Anemia ; suspect that this is secondary likely to GI Bleed; noted that in 2019, patient's colonoscopy was "poorly prepped."  patient noted to have low Vitamin B12 on previous blood work. \par - w/u by GI Dr Tamez: EGD and colonoscopy  were not remarkable ; s/p full GI w/u including capsule endoscopy \par - patient continues losing blood as assessed by persistently low iron stores after iron infusion\par - s/p transfusion for 2 units PRBC on 6.4.2021\par - s/p Venofer 200mg x 5 doses as of 8/28/2020, 2/12/21, 4/23/21, 1/7/22, 8/26/22, 11/11/22\par - GI and  w/u not revealing ; iron again decreased and Hgb 8.5g/dL\par - START venofer weekly x 5\par \par # Prostatomegaly with thick-walled urinary bladder noted on CT from 08/2020\par - Urologic follow-up recommended, Dr. Margarito Pemberton to r/o bladder neoplasm, and correlation with direct visualization as warranted \par \par # 3.2 x 3.2 cm hypoattenuating pancreatic tail lesion; cystic lesions in the pancreatic head on CT from 08/2020\par - MR Abd from 09/2020 which shows multicystic pancreatic tail lesion measuring up to 3.0 cm\par - discussed indication for FNA to r/o malignancy : GI f/u: : PET scan did not note hypermetabolic mass\par pt wants to watch for now\par - PET/CT 11.23.2020 showed no pathologic FDG uptake to suggest biologic tumor activity\par \par # RUL spiculated nodule, suspicious.  Noted on 08/2020\par - Short-term follow-up in 3 months, PET/CT or tissue sampling was recommended (~11/2020)\par - PET/CT Nov 2020 did not note hypermetabolic mass; explained that carcinoid or BALC may not present w/ an increased uptake on pet \par - pt  deferred reimaging as he follows with pulmonary / thoracic \par - agreed NOT TO REPEAT PET SCAN as per request of pt and his daughter; his grandson is an ICU ATTENDING\par \par Strongly recommend CT Head no contrast + XR imaging due to recent fall (no LoC) but patient and daughter again defer as he did not hit his head, is not symptomatic and they would like to speak with PCP.  \par \par \par agreed that we will not do w/u of pancreas,bladder and lung lesions; pt/his daughter and his son (ICU atttending ) understand and agree; he has unexplained severe HOMERO;restart venover again as iron stores are low\par \par \par \par \par \par

## 2022-12-16 NOTE — REASON FOR VISIT
[Follow-Up Visit] : a follow-up visit for [Other: _____] : [unfilled] [FreeTextEntry2] : HOMERO [TWNoteComboBox1] : [place]

## 2022-12-16 NOTE — PHYSICAL EXAM
[Fully active, able to carry on all pre-disease performance without restriction] : Status 0 - Fully active, able to carry on all pre-disease performance without restriction [Normal] : normal appearance, no rash, nodules, vesicles, ulcers, erythema [de-identified] : L eyelid closed [de-identified] : ambulating with assistance of single-point cane

## 2023-01-01 ENCOUNTER — LABORATORY RESULT (OUTPATIENT)
Age: 88
End: 2023-01-01

## 2023-01-01 ENCOUNTER — APPOINTMENT (OUTPATIENT)
Dept: INFUSION THERAPY | Facility: CLINIC | Age: 88
End: 2023-01-01

## 2023-01-01 ENCOUNTER — APPOINTMENT (OUTPATIENT)
Dept: HEMATOLOGY ONCOLOGY | Facility: CLINIC | Age: 88
End: 2023-01-01

## 2023-01-01 ENCOUNTER — OUTPATIENT (OUTPATIENT)
Dept: OUTPATIENT SERVICES | Facility: HOSPITAL | Age: 88
LOS: 1 days | End: 2023-01-01

## 2023-01-01 ENCOUNTER — OUTPATIENT (OUTPATIENT)
Dept: OUTPATIENT SERVICES | Facility: HOSPITAL | Age: 88
LOS: 1 days | Discharge: HOME | End: 2023-01-01
Payer: MEDICARE

## 2023-01-01 DIAGNOSIS — K86.2 CYST OF PANCREAS: ICD-10-CM

## 2023-01-01 DIAGNOSIS — D50.9 IRON DEFICIENCY ANEMIA, UNSPECIFIED: ICD-10-CM

## 2023-01-01 DIAGNOSIS — R91.1 SOLITARY PULMONARY NODULE: ICD-10-CM

## 2023-01-01 PROCEDURE — 82728 ASSAY OF FERRITIN: CPT

## 2023-01-01 PROCEDURE — 80076 HEPATIC FUNCTION PANEL: CPT

## 2023-01-01 PROCEDURE — 83540 ASSAY OF IRON: CPT

## 2023-01-01 PROCEDURE — 83550 IRON BINDING TEST: CPT

## 2023-01-01 PROCEDURE — 36415 COLL VENOUS BLD VENIPUNCTURE: CPT

## 2023-01-01 PROCEDURE — 85027 COMPLETE CBC AUTOMATED: CPT

## 2023-01-01 PROCEDURE — 80048 BASIC METABOLIC PNL TOTAL CA: CPT

## 2023-01-01 RX ORDER — IRON SUCROSE 20 MG/ML
200 INJECTION, SOLUTION INTRAVENOUS ONCE
Refills: 0 | Status: COMPLETED | OUTPATIENT
Start: 2023-01-01 | End: 2023-01-01

## 2023-01-01 RX ADMIN — IRON SUCROSE 220 MILLIGRAM(S): 20 INJECTION, SOLUTION INTRAVENOUS at 14:14

## 2023-01-01 RX ADMIN — IRON SUCROSE 220 MILLIGRAM(S): 20 INJECTION, SOLUTION INTRAVENOUS at 12:50

## 2023-01-01 RX ADMIN — IRON SUCROSE 220 MILLIGRAM(S): 20 INJECTION, SOLUTION INTRAVENOUS at 14:20

## 2023-03-07 ENCOUNTER — APPOINTMENT (OUTPATIENT)
Dept: HEMATOLOGY ONCOLOGY | Facility: CLINIC | Age: 88
End: 2023-03-07